# Patient Record
Sex: FEMALE | Race: WHITE | Employment: FULL TIME | URBAN - METROPOLITAN AREA
[De-identification: names, ages, dates, MRNs, and addresses within clinical notes are randomized per-mention and may not be internally consistent; named-entity substitution may affect disease eponyms.]

---

## 2018-09-27 PROBLEM — R19.7 DIARRHEA: Status: ACTIVE | Noted: 2018-09-27

## 2018-09-27 PROBLEM — E06.3 HYPOTHYROIDISM DUE TO HASHIMOTO'S THYROIDITIS: Status: ACTIVE | Noted: 2018-09-27

## 2023-01-04 PROBLEM — Z3A.14 14 WEEKS GESTATION OF PREGNANCY: Status: RESOLVED | Noted: 2022-07-07 | Resolved: 2023-01-04

## 2023-01-04 PROBLEM — Z3A.40 40 WEEKS GESTATION OF PREGNANCY: Status: RESOLVED | Noted: 2023-01-03 | Resolved: 2023-01-04

## 2023-03-07 ENCOUNTER — TELEPHONE (OUTPATIENT)
Dept: OBGYN | Age: 33
End: 2023-03-07

## 2023-03-15 ENCOUNTER — HOSPITAL ENCOUNTER (OUTPATIENT)
Dept: PHYSICAL THERAPY | Facility: CLINIC | Age: 33
Setting detail: THERAPIES SERIES
Discharge: HOME | End: 2023-03-15
Payer: COMMERCIAL

## 2023-03-15 PROCEDURE — 97161 PT EVAL LOW COMPLEX 20 MIN: CPT

## 2023-03-15 PROCEDURE — 97110 THERAPEUTIC EXERCISES: CPT

## 2023-03-15 PROCEDURE — 97530 THERAPEUTIC ACTIVITIES: CPT
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
## 2023-03-24 ENCOUNTER — HOSPITAL ENCOUNTER (OUTPATIENT)
Dept: PHYSICAL THERAPY | Facility: CLINIC | Age: 33
Setting detail: THERAPIES SERIES
Discharge: HOME | End: 2023-03-24
Payer: COMMERCIAL

## 2023-03-24 PROCEDURE — 97140 MANUAL THERAPY 1/> REGIONS: CPT

## 2023-03-24 PROCEDURE — 97110 THERAPEUTIC EXERCISES: CPT

## 2023-03-24 NOTE — FLOWSHEET NOTE
[] [address]  - [facility] &  Therapy  [address]  [phone]  F: [phone] [x] [address]  [facility] [address]   [address]  P: [phone]  F: [phone] [] [address]  [address]  P: [phone]  F: [phone] [] [address]  P: [phone]  F: [phone] [] [address]  [facility]   [address]   [place]: [phone]  F: [phone]      Physical Therapy Daily Treatment Note    Date:  3/24/2023  Patient Name:  Daniella Vallejo    :  1990  MRN: [id number]  Physician: Fernanda Charles DO                       Insurance: [employer] [facility] [place] PPO- 7 visits approved 3/15/23-23 Auth # [id number]  Medical Diagnosis: O80 (ICD-10-CM) - Vaginal delivery; O80 (ICD-10-CM) -  (spontaneous vaginal delivery)  Rehab Codes: R27.8, R29.3, M62.81, N39.46, R10.2, M99.05  Onset Date: 1/3/23       Next 's appt.: unknown  Visit# / total visits:  (7 approved)    Cancels/No Shows: 0    Subjective:    Pain:  [] Yes  [x] No  Location:  N/A  Pain Rating: (0-10 scale) 0/10  Pain altered Tx:  [x] No  [] Yes  Action:    Comments: Pt denies pain at this time. But states that her L pubic bone area continues to cause pain. Pt had noticed it with running, but has held running since starting PT. Pt will notice it at night when feeding her infant son in bed, causing her difficulty falling back to sleep at times. Pt denies c/o incontinence or pelvic heaviness.     Objective:  Modalities:   Precautions: h/o B hip labral repair   Exercises: [employer] Access Code: [email or web address]  KT= Kinesiotape  PB= pelvic bracing (DB+ kegel)  DB= diaphragmatic breathing  Exercise Reps/ Time Weight/ Level Comments   Pelvic model explanation & education

## 2023-03-30 ENCOUNTER — HOSPITAL ENCOUNTER (OUTPATIENT)
Dept: PHYSICAL THERAPY | Facility: CLINIC | Age: 33
Setting detail: THERAPIES SERIES
Discharge: HOME | End: 2023-03-30
Payer: COMMERCIAL

## 2023-03-30 PROCEDURE — 97140 MANUAL THERAPY 1/> REGIONS: CPT

## 2023-03-30 PROCEDURE — 97110 THERAPEUTIC EXERCISES: CPT

## 2023-03-30 PROCEDURE — 97530 THERAPEUTIC ACTIVITIES: CPT

## 2023-03-30 NOTE — FLOWSHEET NOTE
Contractions in Hooklying with Adduction  - 1 x daily - 7 x weekly - 10 reps - 5 seconds hold  - Pelvic Floor Contractions in Hooklying with Resisted Abduction  - 1 x daily - 7 x weekly - 2 sets - 10 reps  - Bent Knee Fallouts  - 1 x daily - 7 x weekly - 10 reps  - Supine Bridge with Pelvic Floor Contraction  - 1 x daily - 7 x weekly - 2 sets - 10 reps  - Cat Cow  - 1 x daily - 7 x weekly - 10 reps - 5 seconds hold  - Supine Shoulder Extension with Anchored Resistance  - 1 x daily - 7 x weekly - 1 sets - 10 reps  - Bear Plank from [employer]  - 1 x daily - 7 x weekly - 5 reps - 5-10 seconds hold  - Standing Anti-Rotation Press with Anchored Resistance  - 1 x daily - 7 x weekly - 2 sets - 10 reps  - Rowing with Pelvic Floor Contraction  - 1 x daily - 7 x weekly - 2 sets - 10 reps  - Mini Squat with Pelvic Floor Contraction band on thighs - 1 x daily - 7 x weekly - 2 sets - 10 reps  - Sit to Stand with Resistance Around Legs  - 1 x daily - 7 x weekly - 1 sets - 10 reps    Patient Education  - Urinary Incontinence  - Office Posture  - Sleep Positions  - Bowel Emptying Techniques  - Get To Know Your Pelvic Floor- Female  - Healthy Posture: How to Hold and Lift a Baby  - Flat Back Posture    Comprehension of Education:  [x] Verbalizes understanding. [x] Demonstrates understanding. [x] Needs review. To ensure dec in asymmetrical pelvic loading & good tolerance to PREs  [x] Demonstrates/verbalizes HEP/Ed previously given. Plan: [x] Continue current frequency toward long and short term goals. [x] Specific Instructions for subsequent treatments: HEP review, pelvic brace and excursion, DR manual sequence prn -may not be necessary with proper core/PFM & pressure management, higher level strengthening, run protocol.       Time In: 10:01am             Time Out: 11:01am    Electronically signed by:  Gino Lynn PT

## 2023-04-04 ENCOUNTER — HOSPITAL ENCOUNTER (OUTPATIENT)
Dept: PHYSICAL THERAPY | Facility: CLINIC | Age: 33
Setting detail: THERAPIES SERIES
Discharge: HOME | End: 2023-04-04
Payer: COMMERCIAL

## 2023-04-04 PROCEDURE — 97140 MANUAL THERAPY 1/> REGIONS: CPT

## 2023-04-04 PROCEDURE — 97110 THERAPEUTIC EXERCISES: CPT

## 2023-04-04 NOTE — FLOWSHEET NOTE
Educated pt on this pelvic belt for stabilization - donned - suggested S or M if future plans of pregnacy, on pelvic model; again briefly discussed refraining from asymmetrical loading on pelvis      Treatment Charges: Mins Units   []  Modalities     [x]  Ther Exercise 45 3   [x]  Manual Therapy 8 1   [x]  Ther Activities 2 0   []  Aquatics     []  Vasocompression     []  Other     Total Treatment time 55 4       Assessment: [x] Progressing toward goals. Began tx with manual therapy to address somatic dysfunction at B hip flexors ( L worse than R), as documented above with notable corrections & pain reduction upon completion. Proceeded with exercises as charted above to maintain pelvic girdle stabilization & PREs with proper pelvic bracing. Pt tolerates all ex & additions well, except for iso dead bug, which was attempted but reproduced PS pain; thus discontinued. See ex log for progressions. Post-tx pt feels good & reports she is pain-free. Updated HEP as detailed below & in ex chart log. Again discussed minimizing asymmetrical loading on pelvis; educated on somatic dysfunctions & purchase/use of pelvic belt. Will cont to progress postpartum running checklist as tolerated. [] No change. [] Other:  [x] Patient would continue to benefit from skilled physical therapy services in order to: strengthen PF, core, gluteals to improve EPI & pressure regulation, postural awareness, body mechanics & return to running & higher level pre-pregnancy function. Problems:   [x] ? Pain: L abdominopelvic, LBP, L SIJ  [x] ? ROM: PFM excursion, B Hip inflexibility (B HS, B piriformis, B hip flexors), L thoracic rotation   [x] ? Strength: core, PF, & B hip weakness   [x] ?  Function: ALAN: 25% impairment, 6 points; PPI 3 points - \"quite a bit\" for physical activity tolerance  [x] Other: NGUYEN, EPI, impaired load transfer, Core instability & difficultly managing IAP noted with B SLS & B ASLR; pelvic girdle dysfunction - see

## 2023-04-18 ENCOUNTER — HOSPITAL ENCOUNTER (OUTPATIENT)
Dept: PHYSICAL THERAPY | Facility: CLINIC | Age: 33
Setting detail: THERAPIES SERIES
Discharge: HOME | End: 2023-04-18
Payer: COMMERCIAL

## 2023-04-18 PROCEDURE — 97110 THERAPEUTIC EXERCISES: CPT

## 2023-04-18 PROCEDURE — 97140 MANUAL THERAPY 1/> REGIONS: CPT

## 2023-04-18 NOTE — FLOWSHEET NOTE
3 x daily - 7 x weekly - 1 sets - 10 reps - 6 second hold  - Pelvic Floor Contractions in Hooklying with Adduction  - 1 x daily - 7 x weekly - 10 reps - 6 seconds hold  - Pelvic Floor Contractions in Hooklying with Resisted Abduction  - 1 x daily - 7 x weekly - 2 sets - 10 reps  - Bent Knee Fallouts  - 1 x daily - 7 x weekly - 10 reps  - Supine Bridge with Pelvic Floor Contraction  - 1 x daily - 7 x weekly - 2 sets - 10 reps  - Supine Shoulder Extension with Anchored Resistance  - 1 x daily - 7 x weekly - 1 sets - 10 reps  - Cat Cow  - 1 x daily - 7 x weekly - 10 reps - 6 seconds hold  - Quadruped Pelvic Floor Contraction with Opposite Arm and Leg Lift  - 1 x daily - 7 x weekly - 2 sets - 5 reps  - Bear Plank from [employer] with ball squeeze - 1 x daily - 7 x weekly - 5 reps - 5-10 seconds hold  - Standing Anti-Rotation Press with Anchored Resistance  - 1 x daily - 7 x weekly - 2 sets - 10 reps  - Rowing with Pelvic Floor Contraction  - 1 x daily - 7 x weekly - 2 sets - 10 reps  - Mini Squat with Pelvic Floor Contraction  - 1 x daily - 7 x weekly - 2 sets - 10 reps  - Sit to Stand with Resistance Around Legs & infant press  - 1 x daily - 7 x weekly - 1 sets - 10 reps  - Standing Heel Raise  - 1 x daily - 7 x weekly - 4 sets - 5 reps  - Sidestepping with Pelvic Floor Contraction  - 1 x daily - 7 x weekly - 1 sets - 10 reps    Patient Education  - Urinary Incontinence  - Office Posture  - Sleep Positions  - Bowel Emptying Techniques  - Get To Know Your Pelvic Floor- Female  - Healthy Posture: How to Hold and Lift a Baby  - Flat Back Posture    Comprehension of Education:  [] Verbalizes understanding. [] Demonstrates understanding. [] Needs review for pain complaints  [x] Demonstrates/verbalizes HEP/Ed previously given. Plan: [x] Continue current frequency toward long and short term goals. [x] Specific Instructions for subsequent treatments: update HEP as appropriate, correction of somatic dysfunction. , higher

## 2023-04-25 ENCOUNTER — HOSPITAL ENCOUNTER (OUTPATIENT)
Dept: PHYSICAL THERAPY | Facility: CLINIC | Age: 33
Setting detail: THERAPIES SERIES
Discharge: HOME | End: 2023-04-25
Payer: COMMERCIAL

## 2023-04-25 PROCEDURE — 97110 THERAPEUTIC EXERCISES: CPT

## 2023-04-25 PROCEDURE — 97140 MANUAL THERAPY 1/> REGIONS: CPT

## 2023-04-25 NOTE — FLOWSHEET NOTE
difficultly managing IAP noted with B SLS & B ASLR; pelvic girdle dysfunction - see objective testing in eval for + cluster testing       LTG: (to be met in 14 treatments) reassess at 7 visits due to visit approval   Pt will be able to isolate PF musculature for improved PF awareness & function - met  ? Strength: Pt to achieve PF strength of 4/5 &/or endurance for >8 seconds for optimal PF function   ? Function: Pt will have no reports of leaking with cough, sneeze and laugh, running or higher level ADLs for improved urogenital function - met  Independent with Home Exercise Programs for improved core & PFM function - met  Pt to demo ability to hold B ASLR & B SLS without significant compensations to reduce load transfer and improve core, SL stability & endurance  Pt will demo bilateral hip to 4+/5 & abdominal strength to \"good\" for improved ADLs   Pt to demo improved IAP regulation and reduction of EPI by 0.5 finger width or greater & < 0.5-1 knuckle depth at for improved core stability & proper tension generation through linea alba. Pt will report 2-3 point improvement on ALAN & report \"little bit\" or \"not at all\" on PPIQ (for physical activity) to demonstrate improved pelvic health functioning  Pt will demonstrate proper breath, core & pressure management, & no pelvic or urinary sxs, with return to running protocol to progress running hobby. ongoing  Pt will report ability to return to running 2 miles or greater without pain to resume running hobby- not met d/t pain     Patient goals: \"Return to higher level physical activity & decrease pain\"    Pt. Education:  [x] Yes  [] No  [x] Reviewed Prior HEP/Ed  Method of Education: [x] Verbal   Demo - see assessment for details    [] Written-    Access Code: [email or web address]  URL: [email or web address]  Date: 04/04/2023  Prepared by: Ernesto Glaser    Program Notes  Serola Belt Stool or squatty potty  for defecation Lumbar roll for backexhale & pelvic brace with effort &

## 2023-05-02 ENCOUNTER — HOSPITAL ENCOUNTER (OUTPATIENT)
Dept: PHYSICAL THERAPY | Facility: CLINIC | Age: 33
Setting detail: THERAPIES SERIES
Discharge: HOME | End: 2023-05-02
Payer: COMMERCIAL

## 2023-05-02 PROCEDURE — 97140 MANUAL THERAPY 1/> REGIONS: CPT

## 2023-05-02 PROCEDURE — 97110 THERAPEUTIC EXERCISES: CPT

## 2023-05-02 RX ORDER — LEVOTHYROXINE SODIUM 0.07 MG/1
37.5 TABLET ORAL DAILY
Qty: 90 TABLET | Refills: 5 | Status: SHIPPED | OUTPATIENT
Start: 2023-05-02

## 2023-05-02 NOTE — FLOWSHEET NOTE
PB 4x5 Holding 15# ball New 4/4   Dead lift 10x 2-8#db Increased weight 4/25; good form   Staggered stance dead lift 10x ea 2-8# db Added 4/25   Sidestepping with PB  10x Blue  New 4/4   SL sit to stand 10x ea  Added 5/2         Other: bolded completed 05/02/23; pt's son Micaela Dozier present for treatment     Manual: hypervolt in prone to L sacral ligaments, followed by manual correction for L extended sacrum. Treatment Charges: Mins Units   []  Modalities     [x]  Ther Exercise 37 2   [x]  Manual Therapy 9 1   []  Ther Activities     []  Aquatics     []  Vasocompression     []  Other     Total Treatment time 46 3       Assessment: [x] Progressing toward goals. Started session with goal check (refer below). Then, checked pelvic alignment. Pt with equal pubic alignment this date and reduced tenderness to palpation. Pt continues with sacral extension on the L side. Addressed this with manual treatment noted above. Continued then with exercises to progress pelvic floor and pelvic stability. Pt tolerates exercise progressions well. Pending pain will progress toward return to running at next session. [] No change. [] Other:    [x] Patient would continue to benefit from skilled physical therapy services in order to: strengthen PF, core, gluteals to improve EPI & pressure regulation, postural awareness, body mechanics & return to running & higher level pre-pregnancy function. Problems:   [x] ? Pain: L abdominopelvic, LBP, L SIJ  [x] ? ROM: PFM excursion, B Hip inflexibility (B HS, B piriformis, B hip flexors), L thoracic rotation   [x] ? Strength: core, PF, & B hip weakness   [x] ?  Function: ALAN: 25% impairment, 6 points; PPI 3 points - \"quite a bit\" for physical activity tolerance  [x] Other: NGUYEN, EPI, impaired load transfer, Core instability & difficultly managing IAP noted with B SLS & B ASLR; pelvic girdle dysfunction - see objective testing in eval for + cluster testing       LTG: (to be met in 14

## 2023-05-07 ENCOUNTER — TELEPHONE (OUTPATIENT)
Dept: OBGYN | Age: 33
End: 2023-05-07

## 2023-05-07 RX ORDER — DICLOXACILLIN SODIUM 250 MG/1
250 CAPSULE ORAL 4 TIMES DAILY
Qty: 40 CAPSULE | Refills: 0 | Status: SHIPPED | OUTPATIENT
Start: 2023-05-07 | End: 2023-05-17

## 2023-05-08 NOTE — TELEPHONE ENCOUNTER
Patient with complaints of mastitis. Rx sent for to pharmacy.      Jordan Ryder DO  4/7/8122, 8:23 AM

## 2023-05-12 ENCOUNTER — HOSPITAL ENCOUNTER (OUTPATIENT)
Dept: PHYSICAL THERAPY | Facility: CLINIC | Age: 33
Setting detail: THERAPIES SERIES
Discharge: HOME | End: 2023-05-12
Payer: COMMERCIAL

## 2023-05-12 PROCEDURE — 97110 THERAPEUTIC EXERCISES: CPT

## 2023-05-12 NOTE — FLOWSHEET NOTE
Heel Raise  - 1 x daily - 7 x weekly - 4 sets - 5 reps  - Sidestepping with Pelvic Floor Contraction  - 1 x daily - 7 x weekly - 1 sets - 10 reps  - Single Leg Sit to Stand with Arms Extended  - 1 x daily - 7 x weekly - 10 reps  - Deadlift with Pelvic Contraction  - 1 x daily - 7 x weekly - 10 reps  - Modified Single-Leg Deadlift  - 1 x daily - 7 x weekly - 10 reps  Patient Education  - Urinary Incontinence  - Office Posture  - Sleep Positions  - Bowel Emptying Techniques  - Get To Know Your Pelvic Floor- Female  - Healthy Posture: How to Hold and Lift a Baby  - Flat Back Posture  Comprehension of Education:  [x] Verbalizes understanding. [x] Demonstrates understanding. [x] Needs review   [x] Demonstrates/verbalizes HEP/Ed previously given. Plan: [x] Continue current frequency toward long and short term goals.   Pt can only attend PT Tuesday and Friday d/t work schedule   [x] Specific Instructions for subsequent treatments: monitor response to return to running checklist      Time In: 11:08am             Time Out: 11:46am    Electronically signed by:  Yadiel Funez PTA

## 2024-02-29 SDOH — HEALTH STABILITY: PHYSICAL HEALTH: ON AVERAGE, HOW MANY MINUTES DO YOU ENGAGE IN EXERCISE AT THIS LEVEL?: 50 MIN

## 2024-02-29 SDOH — HEALTH STABILITY: PHYSICAL HEALTH: ON AVERAGE, HOW MANY DAYS PER WEEK DO YOU ENGAGE IN MODERATE TO STRENUOUS EXERCISE (LIKE A BRISK WALK)?: 6 DAYS

## 2024-03-01 ENCOUNTER — OFFICE VISIT (OUTPATIENT)
Age: 34
End: 2024-03-01
Payer: COMMERCIAL

## 2024-03-01 ENCOUNTER — HOSPITAL ENCOUNTER (OUTPATIENT)
Age: 34
Setting detail: SPECIMEN
Discharge: HOME | End: 2024-03-01

## 2024-03-01 VITALS
RESPIRATION RATE: 12 BRPM | HEIGHT: 64 IN | WEIGHT: 124.8 LBS | HEART RATE: 79 BPM | BODY MASS INDEX: 21.31 KG/M2 | DIASTOLIC BLOOD PRESSURE: 62 MMHG | SYSTOLIC BLOOD PRESSURE: 99 MMHG

## 2024-03-01 DIAGNOSIS — E03.8 HYPOTHYROIDISM DUE TO HASHIMOTO'S THYROIDITIS: ICD-10-CM

## 2024-03-01 DIAGNOSIS — Z11.59 NEED FOR HEPATITIS C SCREENING TEST: ICD-10-CM

## 2024-03-01 DIAGNOSIS — Z13.220 SCREENING, LIPID: ICD-10-CM

## 2024-03-01 DIAGNOSIS — E03.8 HYPOTHYROIDISM DUE TO HASHIMOTO'S THYROIDITIS: Primary | ICD-10-CM

## 2024-03-01 DIAGNOSIS — E06.3 HYPOTHYROIDISM DUE TO HASHIMOTO'S THYROIDITIS: Primary | ICD-10-CM

## 2024-03-01 DIAGNOSIS — Z00.00 ROUTINE GENERAL MEDICAL EXAMINATION AT A HEALTH CARE FACILITY: ICD-10-CM

## 2024-03-01 DIAGNOSIS — E06.3 HYPOTHYROIDISM DUE TO HASHIMOTO'S THYROIDITIS: ICD-10-CM

## 2024-03-01 LAB
ALBUMIN SERPL-MCNC: 4.2 G/DL (ref 3.5–5.2)
ALBUMIN/GLOB SERPL: 2 {RATIO} (ref 1–2.5)
ALP SERPL-CCNC: 63 U/L (ref 35–104)
ALT SERPL-CCNC: 16 U/L (ref 10–35)
ANION GAP SERPL CALCULATED.3IONS-SCNC: 10 MMOL/L (ref 9–16)
AST SERPL-CCNC: 19 U/L (ref 10–35)
BASOPHILS # BLD: <0.03 K/UL (ref 0–0.2)
BASOPHILS NFR BLD: 0 % (ref 0–2)
BILIRUB SERPL-MCNC: 0.4 MG/DL (ref 0–1.2)
BUN SERPL-MCNC: 9 MG/DL (ref 6–20)
CALCIUM SERPL-MCNC: 9.6 MG/DL (ref 8.6–10.4)
CHLORIDE SERPL-SCNC: 105 MMOL/L (ref 98–107)
CHOLEST SERPL-MCNC: 111 MG/DL (ref 0–199)
CHOLESTEROL/HDL RATIO: 3
CO2 SERPL-SCNC: 28 MMOL/L (ref 20–31)
CREAT SERPL-MCNC: 0.7 MG/DL (ref 0.5–0.9)
EOSINOPHIL # BLD: <0.03 K/UL (ref 0–0.44)
EOSINOPHILS RELATIVE PERCENT: 0 % (ref 1–4)
ERYTHROCYTE [DISTWIDTH] IN BLOOD BY AUTOMATED COUNT: 11.8 % (ref 11.8–14.4)
GFR SERPL CREATININE-BSD FRML MDRD: >60 ML/MIN/1.73M2
GLUCOSE SERPL-MCNC: 82 MG/DL (ref 74–99)
HCT VFR BLD AUTO: 37.8 % (ref 36.3–47.1)
HCV AB SERPL QL IA: NONREACTIVE
HDLC SERPL-MCNC: 40 MG/DL
HGB BLD-MCNC: 12.2 G/DL (ref 11.9–15.1)
IMM GRANULOCYTES # BLD AUTO: <0.03 K/UL (ref 0–0.3)
IMM GRANULOCYTES NFR BLD: 0 %
LDLC SERPL CALC-MCNC: 64 MG/DL (ref 0–100)
LYMPHOCYTES NFR BLD: 1.51 K/UL (ref 1.1–3.7)
LYMPHOCYTES RELATIVE PERCENT: 28 % (ref 24–43)
MCH RBC QN AUTO: 30.7 PG (ref 25.2–33.5)
MCHC RBC AUTO-ENTMCNC: 32.3 G/DL (ref 28.4–34.8)
MCV RBC AUTO: 95 FL (ref 82.6–102.9)
MONOCYTES NFR BLD: 0.28 K/UL (ref 0.1–1.2)
MONOCYTES NFR BLD: 5 % (ref 3–12)
NEUTROPHILS NFR BLD: 67 % (ref 36–65)
NEUTS SEG NFR BLD: 3.54 K/UL (ref 1.5–8.1)
NRBC BLD-RTO: 0 PER 100 WBC
PLATELET # BLD AUTO: 294 K/UL (ref 138–453)
PMV BLD AUTO: 10.4 FL (ref 8.1–13.5)
POTASSIUM SERPL-SCNC: 3.9 MMOL/L (ref 3.7–5.3)
PROT SERPL-MCNC: 7 G/DL (ref 6.6–8.7)
RBC # BLD AUTO: 3.98 M/UL (ref 3.95–5.11)
SODIUM SERPL-SCNC: 143 MMOL/L (ref 136–145)
TRIGL SERPL-MCNC: 37 MG/DL
TSH SERPL DL<=0.05 MIU/L-ACNC: 2.1 UIU/ML (ref 0.27–4.2)
VLDLC SERPL CALC-MCNC: 7 MG/DL
WBC OTHER # BLD: 5.4 K/UL (ref 3.5–11.3)

## 2024-03-01 PROCEDURE — 99203 OFFICE O/P NEW LOW 30 MIN: CPT | Performed by: STUDENT IN AN ORGANIZED HEALTH CARE EDUCATION/TRAINING PROGRAM

## 2024-03-01 PROCEDURE — 99213 OFFICE O/P EST LOW 20 MIN: CPT | Performed by: STUDENT IN AN ORGANIZED HEALTH CARE EDUCATION/TRAINING PROGRAM

## 2024-03-01 RX ORDER — CHOLECALCIFEROL (VITAMIN D3) 125 MCG
CAPSULE ORAL
COMMUNITY

## 2024-03-01 SDOH — ECONOMIC STABILITY: FOOD INSECURITY: WITHIN THE PAST 12 MONTHS, THE FOOD YOU BOUGHT JUST DIDN'T LAST AND YOU DIDN'T HAVE MONEY TO GET MORE.: NEVER TRUE

## 2024-03-01 SDOH — ECONOMIC STABILITY: HOUSING INSECURITY
IN THE LAST 12 MONTHS, WAS THERE A TIME WHEN YOU DID NOT HAVE A STEADY PLACE TO SLEEP OR SLEPT IN A SHELTER (INCLUDING NOW)?: NO

## 2024-03-01 SDOH — ECONOMIC STABILITY: INCOME INSECURITY: HOW HARD IS IT FOR YOU TO PAY FOR THE VERY BASICS LIKE FOOD, HOUSING, MEDICAL CARE, AND HEATING?: NOT HARD AT ALL

## 2024-03-01 SDOH — ECONOMIC STABILITY: FOOD INSECURITY: WITHIN THE PAST 12 MONTHS, YOU WORRIED THAT YOUR FOOD WOULD RUN OUT BEFORE YOU GOT MONEY TO BUY MORE.: NEVER TRUE

## 2024-03-01 ASSESSMENT — PATIENT HEALTH QUESTIONNAIRE - PHQ9
2. FEELING DOWN, DEPRESSED OR HOPELESS: NOT AT ALL
1. LITTLE INTEREST OR PLEASURE IN DOING THINGS: NOT AT ALL
SUM OF ALL RESPONSES TO PHQ QUESTIONS 1-9: 0
SUM OF ALL RESPONSES TO PHQ9 QUESTIONS 1 & 2: 0
SUM OF ALL RESPONSES TO PHQ QUESTIONS 1-9: 0

## 2024-03-01 NOTE — PROGRESS NOTES
[facility]  [address] [place] [address]  Phone: [phone]  Fax: [phone]      Date of Visit:  3/1/24   Patient Name: Cuca Sanon   Patient :  1990     ASSESSMENT/PLAN     1. Hypothyroidism due to Hashimoto's thyroiditis  -     TSH With Reflex Ft4; Future  2. Screening, lipid  -     Lipid Panel; Future  3. Routine general medical examination at a health care facility  -     Comprehensive Metabolic Panel; Future  -     CBC with Auto Differential; Future  4. Need for hepatitis C screening test  -     Hepatitis C Antibody; Future     New patient today    History of Hashimoto's thyroiditis currently on levothyroxine 37.5 mcg daily.  Needs updated TSH.  Is try to get pregnant, did discuss with patient need for increased dose once pregnant.  Verbalized understanding.  Agreeable to other lab work as above.  No other particular questions or concerns.  Advised preconception counseling appointment with OB/GYN    No follow-ups on file.    - Questions/concerns answered. Patient verbalized and expressed understanding. Medications, laboratory testing, imaging, consultation, and follow up as documented in this encounter.     Patient was discussed with preceptor, Dr. Quan at the time of the encounter.      BMI was elevated today, and weight loss plan recommended is : conventional weight loss.     HPI:     Cuca Sanon is a 33 y.o. female with   Patient Active Problem List   Diagnosis    Hypothyroidism due to Hashimoto's thyroiditis    Raynaud's disease without gangrene    Iron deficiency anemia    Diarrhea    Elevated antinuclear antibody (MARLEY) level    Elevated LFTs     who presents today to discuss   Chief Complaint   Patient presents with    New Patient     Hypothyroid needs labs        HPI:     Patient presents as new patient today.  Medical history of Raynaud's disease, deficiency anemia, elevated MARLEY with subsequent negative  H Plasty Text: Given the location of the defect, shape of the defect and the proximity to free margins a H-plasty was deemed most appropriate for repair.  Using a sterile surgical marker, the appropriate advancement arms of the H-plasty were drawn incorporating the defect and placing the expected incisions within the relaxed skin tension lines where possible. The area thus outlined was incised deep to adipose tissue with a #15 scalpel blade. The skin margins were undermined to an appropriate distance in all directions utilizing iris scissors.  The opposing advancement arms were then advanced into place in opposite direction and anchored with interrupted buried subcutaneous sutures.

## 2024-03-01 NOTE — PATIENT INSTRUCTIONS
Thank you for coming in today, Cuca.  As discussed, we are ordering several labs. We will call you with results. I would recommend follow up at least annually. If you become pregnant, I would also recommend follow up after delivering to make sure your thyroid dosing is still appropriate.  Call or return with any additional questions or concerns.

## 2024-04-09 DIAGNOSIS — N97.0 ABSENCE OF OVULATION: Primary | ICD-10-CM

## 2024-04-16 DIAGNOSIS — Z13.220 SCREENING, LIPID: ICD-10-CM

## 2024-04-16 DIAGNOSIS — E06.3 HYPOTHYROIDISM DUE TO HASHIMOTO'S THYROIDITIS: Primary | ICD-10-CM

## 2024-04-16 DIAGNOSIS — E03.8 HYPOTHYROIDISM DUE TO HASHIMOTO'S THYROIDITIS: Primary | ICD-10-CM

## 2024-04-23 ENCOUNTER — HOSPITAL ENCOUNTER (OUTPATIENT)
Age: 34
Setting detail: SPECIMEN
Discharge: HOME | End: 2024-04-23

## 2024-04-23 DIAGNOSIS — E03.8 HYPOTHYROIDISM DUE TO HASHIMOTO'S THYROIDITIS: ICD-10-CM

## 2024-04-23 DIAGNOSIS — N97.0 ABSENCE OF OVULATION: ICD-10-CM

## 2024-04-23 DIAGNOSIS — E06.3 HYPOTHYROIDISM DUE TO HASHIMOTO'S THYROIDITIS: ICD-10-CM

## 2024-04-23 LAB
PROGEST SERPL-MCNC: 7.47 NG/ML
TSH SERPL DL<=0.05 MIU/L-ACNC: 2.77 UIU/ML (ref 0.27–4.2)

## 2024-05-07 DIAGNOSIS — E03.8 HYPOTHYROIDISM DUE TO HASHIMOTO'S THYROIDITIS: Primary | ICD-10-CM

## 2024-05-07 DIAGNOSIS — E06.3 HYPOTHYROIDISM DUE TO HASHIMOTO'S THYROIDITIS: Primary | ICD-10-CM

## 2024-05-07 RX ORDER — LEVOTHYROXINE SODIUM 0.07 MG/1
37.5 TABLET ORAL DAILY
Qty: 90 TABLET | Refills: 0 | Status: SHIPPED | OUTPATIENT
Start: 2024-05-07

## 2024-05-26 ENCOUNTER — TELEPHONE (OUTPATIENT)
Dept: OBGYN CLINIC | Age: 34
End: 2024-05-26

## 2024-05-26 RX ORDER — ONDANSETRON 4 MG/1
4 TABLET, FILM COATED ORAL EVERY 8 HOURS PRN
Qty: 60 TABLET | Refills: 1 | Status: SHIPPED | OUTPATIENT
Start: 2024-05-26

## 2024-05-31 ENCOUNTER — HOSPITAL ENCOUNTER (OUTPATIENT)
Age: 34
Setting detail: SPECIMEN
Discharge: HOME | End: 2024-05-31

## 2024-05-31 ENCOUNTER — TELEPHONE (OUTPATIENT)
Dept: OBGYN | Age: 34
End: 2024-05-31

## 2024-05-31 DIAGNOSIS — E03.8 HYPOTHYROIDISM DUE TO HASHIMOTO'S THYROIDITIS: ICD-10-CM

## 2024-05-31 DIAGNOSIS — Z3A.08 8 WEEKS GESTATION OF PREGNANCY: Primary | ICD-10-CM

## 2024-05-31 DIAGNOSIS — E06.3 HYPOTHYROIDISM DUE TO HASHIMOTO'S THYROIDITIS: ICD-10-CM

## 2024-05-31 LAB — TSH SERPL DL<=0.05 MIU/L-ACNC: 0.72 UIU/ML (ref 0.27–4.2)

## 2024-05-31 NOTE — TELEPHONE ENCOUNTER
Patient seen and examined and US completed. US c/w patient's LMP of 4/1/24 which gives DAJA of 1/6/25 and AGA today of 8w4d. Initial prenatal labs ordered. Desires NIPT. Does not want to know gender. Orders placed.     Lucrecia Camacho,   5/31/2024, 5:56 PM

## 2024-06-17 ENCOUNTER — ROUTINE PRENATAL (OUTPATIENT)
Dept: OBGYN | Age: 34
End: 2024-06-17

## 2024-06-17 DIAGNOSIS — Z3A.11 11 WEEKS GESTATION OF PREGNANCY: Primary | ICD-10-CM

## 2024-06-17 PROBLEM — R79.89 ELEVATED LFTS: Status: RESOLVED | Noted: 2018-10-30 | Resolved: 2024-06-17

## 2024-06-17 PROCEDURE — 0500F INITIAL PRENATAL CARE VISIT: CPT | Performed by: OBSTETRICS & GYNECOLOGY

## 2024-06-17 NOTE — PROGRESS NOTES
[facility]  Initial Prenatal Visit    CC: Initial Prenatal Visit    HPI:   Jacey Sanon is a 33 y.o. female  at 11w0d  She is being seen today for her first obstetrical visit. Pregnancy history fully reviewed. This is a planned pregnancy. Her LMP is Patient's last menstrual period was 2024.     The patient was seen and evaluated. The patient complains of nothing.     OB History:  OB History    Para Term  AB Living   2 1 1 0 0 1   SAB IAB Ectopic Molar Multiple Live Births   0 0 0 0 0 1      # Outcome Date GA Lbr Maximus/2nd Weight Sex Delivery Anes PTL Lv   2 Current            1 Term 23 40w4d 01:18 / 00:29 3.57 kg (7 lb 13.9 oz) M Vag-Spont Nitrous Oxid, Pud N CARON      Name: ELVIRA,BABY BOY JACEY      Apgar1: 8  Apgar5: 9       Past Medical History:  Past Medical History:   Diagnosis Date    Hypothyroidism 12 years of age?    Iron deficiency anemia     Raynaud disease     Thyroid disease        Past Surgical History:  Past Surgical History:   Procedure Laterality Date    HIP ARTHROPLASTY Bilateral         Medications:  Current Outpatient Medications on File Prior to Visit   Medication Sig Dispense Refill    ondansetron (ZOFRAN) 4 MG tablet Take 1 tablet by mouth every 8 hours as needed for Nausea 60 tablet 1    levothyroxine (SYNTHROID) 75 MCG tablet Take 0.5 tablets by mouth daily 90 tablet 0    Cholecalciferol (VITAMIN D) 125 MCG (5000 UT) CAPS       Docosahexaenoic Acid (PRENATAL DHA PO) Take by mouth      Calcium 200 MG TABS Take by mouth      magnesium 200 MG TABS tablet Take 1 tablet by mouth daily      IRON PO Take by mouth       No current facility-administered medications on file prior to visit.       Allergies:  Allergies as of 2024    (No Known Allergies)       Social History:  Social History     Socioeconomic History    Marital status:      Spouse name: Not on file    Number of children: Not on file    Years of education: Not on file    Highest

## 2024-06-18 ENCOUNTER — TELEPHONE (OUTPATIENT)
Dept: OBGYN | Age: 34
End: 2024-06-18

## 2024-06-18 ENCOUNTER — HOSPITAL ENCOUNTER (OUTPATIENT)
Age: 34
Setting detail: SPECIMEN
Discharge: HOME | End: 2024-06-18

## 2024-06-18 DIAGNOSIS — Z3A.11 11 WEEKS GESTATION OF PREGNANCY: Primary | ICD-10-CM

## 2024-06-18 DIAGNOSIS — Z3A.08 8 WEEKS GESTATION OF PREGNANCY: ICD-10-CM

## 2024-06-18 LAB
ABO + RH BLD: NORMAL
BASOPHILS # BLD: 0.03 K/UL (ref 0–0.2)
BASOPHILS NFR BLD: 1 % (ref 0–2)
BLOOD GROUP ANTIBODIES SERPL: NEGATIVE
EOSINOPHIL # BLD: 0.06 K/UL (ref 0–0.44)
EOSINOPHILS RELATIVE PERCENT: 1 % (ref 1–4)
ERYTHROCYTE [DISTWIDTH] IN BLOOD BY AUTOMATED COUNT: 12.9 % (ref 11.8–14.4)
HBV SURFACE AG SERPL QL IA: NONREACTIVE
HCT VFR BLD AUTO: 39.8 % (ref 36.3–47.1)
HCV AB SERPL QL IA: NONREACTIVE
HGB BLD-MCNC: 13.2 G/DL (ref 11.9–15.1)
HIV 1+2 AB+HIV1 P24 AG SERPL QL IA: NONREACTIVE
IMM GRANULOCYTES # BLD AUTO: <0.03 K/UL (ref 0–0.3)
IMM GRANULOCYTES NFR BLD: 0 %
LYMPHOCYTES NFR BLD: 1.28 K/UL (ref 1.1–3.7)
LYMPHOCYTES RELATIVE PERCENT: 20 % (ref 24–43)
MCH RBC QN AUTO: 31 PG (ref 25.2–33.5)
MCHC RBC AUTO-ENTMCNC: 33.2 G/DL (ref 28.4–34.8)
MCV RBC AUTO: 93.4 FL (ref 82.6–102.9)
MONOCYTES NFR BLD: 0.27 K/UL (ref 0.1–1.2)
MONOCYTES NFR BLD: 4 % (ref 3–12)
NEUTROPHILS NFR BLD: 74 % (ref 36–65)
NEUTS SEG NFR BLD: 4.84 K/UL (ref 1.5–8.1)
NRBC BLD-RTO: 0 PER 100 WBC
PLATELET # BLD AUTO: 276 K/UL (ref 138–453)
PMV BLD AUTO: 11.2 FL (ref 8.1–13.5)
RBC # BLD AUTO: 4.26 M/UL (ref 3.95–5.11)
RUBV IGG SERPL QL IA: 121 IU/ML
T PALLIDUM AB SER QL IA: NONREACTIVE
TSH SERPL DL<=0.05 MIU/L-ACNC: 1.86 UIU/ML (ref 0.27–4.2)
WBC OTHER # BLD: 6.5 K/UL (ref 3.5–11.3)

## 2024-06-18 NOTE — TELEPHONE ENCOUNTER
Patient came in for [employer] genetic testing and prenatal labs.  Patient's questions were answered and she was escorted to the lab. [employer] testing kit was completed with blood specimen, lab orders, face sheet and insurance card were placed in [employer] bag and ready for .

## 2024-06-25 DIAGNOSIS — Z3A.11 11 WEEKS GESTATION OF PREGNANCY: ICD-10-CM

## 2024-07-19 ENCOUNTER — ROUTINE PRENATAL (OUTPATIENT)
Dept: OBGYN | Age: 34
End: 2024-07-19

## 2024-07-19 DIAGNOSIS — Z3A.17 17 WEEKS GESTATION OF PREGNANCY: Primary | ICD-10-CM

## 2024-07-19 PROCEDURE — 0502F SUBSEQUENT PRENATAL CARE: CPT | Performed by: OBSTETRICS & GYNECOLOGY

## 2024-07-29 NOTE — PROGRESS NOTES
Prenatal Visit    Cuca Sanon is a 33 y.o. female  at 15w4d    Subjective:  The patient was seen and evaluated. She complains of nothing.      The problem list reflects the active issues addressed during today's visit    VITALS:  111/76  P 82    Assessment & Plan:  Cuca Sanon is a 33 y.o. female  at 15w4d   - Initial prenatal labs were reviewed   - An 18-22 week anatomy ultrasound has been ordered   - NIPT reviewed and normal    - TSH in 3-4 weeks    Patient Active Problem List    Diagnosis Date Noted    Elevated antinuclear antibody (MARLEY) level 10/30/2018    Hypothyroidism due to Hashimoto's thyroiditis 2018    Raynaud's disease without gangrene 2018    Iron deficiency anemia 2018    Diarrhea 2018         VERONICA CARL DO  Ob/Gyn

## 2024-08-05 DIAGNOSIS — Z3A.18 18 WEEKS GESTATION OF PREGNANCY: Primary | ICD-10-CM

## 2024-08-23 ENCOUNTER — ROUTINE PRENATAL (OUTPATIENT)
Dept: OBGYN | Age: 34
End: 2024-08-23

## 2024-08-23 VITALS — DIASTOLIC BLOOD PRESSURE: 83 MMHG | HEART RATE: 79 BPM | SYSTOLIC BLOOD PRESSURE: 117 MMHG

## 2024-08-23 DIAGNOSIS — E06.3 HYPOTHYROIDISM DUE TO HASHIMOTO'S THYROIDITIS: ICD-10-CM

## 2024-08-23 DIAGNOSIS — R10.2 PELVIC PAIN: ICD-10-CM

## 2024-08-23 DIAGNOSIS — I73.00 RAYNAUD'S DISEASE WITHOUT GANGRENE: Primary | ICD-10-CM

## 2024-08-23 DIAGNOSIS — E03.8 HYPOTHYROIDISM DUE TO HASHIMOTO'S THYROIDITIS: ICD-10-CM

## 2024-08-23 NOTE — PROGRESS NOTES
Prenatal Visit    Cuca Sanon is a 33 y.o. female  at 20w4d    The patient was seen and evaluated. She complains of nothing. She reports positive fetal movements. She denies contractions, vaginal bleeding and leakage of fluid. Signs and symptoms of labor and pre-eclampsia were reviewed with the patient in detail. She is to report any of these if they occur. She currently denies signs or symptoms of pre-eclampsia including headache, vision changes, RUQ pain.      The problem list reflects the active issues addressed during today's visit    Vitals:  BP: 117/83  Pulse: 79  Fundal Height (cm): 20 cm  Fetal HR: 155  Movement: Present       Assessment & Plan:  Cuca Sanon is a 33 y.o. female  at 20w4d   - An 18-22 week anatomy ultrasound has been ordered   - NIPT was ordered and was normal   - Pelvic floor PT ordered due to symptoms of pelvic pain with running and urinary incontinence    Patient Active Problem List    Diagnosis Date Noted    Elevated antinuclear antibody (MARLEY) level 10/30/2018    Hypothyroidism due to Hashimoto's thyroiditis 2018    Raynaud's disease without gangrene 2018    Iron deficiency anemia 2018    Diarrhea 2018         VERONICA CARL DO  Ob/Gyn   [facility], [place]  2024, 12:28 PM

## 2024-08-27 ENCOUNTER — ROUTINE PRENATAL (OUTPATIENT)
Dept: PERINATAL CARE | Age: 34
End: 2024-08-27
Payer: COMMERCIAL

## 2024-08-27 ENCOUNTER — HOSPITAL ENCOUNTER (OUTPATIENT)
Age: 34
Setting detail: SPECIMEN
Discharge: HOME | End: 2024-08-27

## 2024-08-27 VITALS
HEIGHT: 64 IN | HEART RATE: 89 BPM | DIASTOLIC BLOOD PRESSURE: 64 MMHG | TEMPERATURE: 98.2 F | SYSTOLIC BLOOD PRESSURE: 107 MMHG | WEIGHT: 144.4 LBS | BODY MASS INDEX: 24.65 KG/M2 | RESPIRATION RATE: 16 BRPM

## 2024-08-27 DIAGNOSIS — E03.9 HYPOTHYROIDISM AFFECTING PREGNANCY IN SECOND TRIMESTER: ICD-10-CM

## 2024-08-27 DIAGNOSIS — O44.00 PLACENTA PREVIA WITHOUT HEMORRHAGE, ANTEPARTUM: ICD-10-CM

## 2024-08-27 DIAGNOSIS — Z3A.21 21 WEEKS GESTATION OF PREGNANCY: ICD-10-CM

## 2024-08-27 DIAGNOSIS — O09.522 MULTIGRAVIDA OF ADVANCED MATERNAL AGE IN SECOND TRIMESTER: Primary | ICD-10-CM

## 2024-08-27 DIAGNOSIS — Z36.86 ENCOUNTER FOR SCREENING FOR RISK OF PRE-TERM LABOR: ICD-10-CM

## 2024-08-27 DIAGNOSIS — E03.8 HYPOTHYROIDISM DUE TO HASHIMOTO'S THYROIDITIS: ICD-10-CM

## 2024-08-27 DIAGNOSIS — O99.282 HYPOTHYROIDISM AFFECTING PREGNANCY IN SECOND TRIMESTER: ICD-10-CM

## 2024-08-27 DIAGNOSIS — E06.3 HYPOTHYROIDISM DUE TO HASHIMOTO'S THYROIDITIS: ICD-10-CM

## 2024-08-27 DIAGNOSIS — O09.899 SHORT INTERVAL BETWEEN PREGNANCIES COMPLICATING PREGNANCY, ANTEPARTUM: ICD-10-CM

## 2024-08-27 LAB — TSH SERPL DL<=0.05 MIU/L-ACNC: 1.48 UIU/ML (ref 0.27–4.2)

## 2024-08-27 PROCEDURE — 76817 TRANSVAGINAL US OBSTETRIC: CPT | Performed by: OBSTETRICS & GYNECOLOGY

## 2024-08-27 PROCEDURE — 99999 PR OFFICE/OUTPT VISIT,PROCEDURE ONLY: CPT | Performed by: OBSTETRICS & GYNECOLOGY

## 2024-08-27 PROCEDURE — 76811 OB US DETAILED SNGL FETUS: CPT | Performed by: OBSTETRICS & GYNECOLOGY

## 2024-08-27 NOTE — PROGRESS NOTES
Please refer to non-invasive prenatal testing/NIPT results (with the [place] Complete test from [employer]).   Please refer to completed maternal carrier testing results (with the [place] test from [employer]) from prior pregnancy.     Options with respect to offering the patient invasive genetic prenatal testing and MSAFP screen could not be completed today, as the patient declines a Maternal-Fetal Medicine physician consultation today.     Advise MSAFP (maternal serum alpha-feto protein level) only lab blood draw in primary OB office (if not previously completed).     Patient declines a Maternal-Fetal Medicine complete physician consultation today regarding the fetal and/or maternal medical/obstetrical complications/co-morbidities of pregnancy.      Maternal-Fetal Medicine (MFM) attending physician will defer all management for these medical/obstetrical complications of pregnancy to the primary attending obstetrical physician/provider, as a result.  Therefore, only an ultrasound evaluation was completed today in the MFM office.      Please refer to Maternal-Fetal Medicine OBGYN resident progress note in EPIC.

## 2024-08-27 NOTE — PROGRESS NOTES
Obstetric/Gynecology Maternal Fetal Medicine Resident Note    Patient notified of ultrasound findings of complete placenta previa.     Patient declines formal consult with MFM attending physician for advanced maternal age and hypothyroidism.     Sanjuana Ruby DO  OBGYNARDA Resident, PGY2  [facility]  8/27/2024, 1:27 PM

## 2024-09-20 ENCOUNTER — ROUTINE PRENATAL (OUTPATIENT)
Dept: OBGYN | Age: 34
End: 2024-09-20

## 2024-09-20 VITALS
WEIGHT: 146 LBS | DIASTOLIC BLOOD PRESSURE: 67 MMHG | SYSTOLIC BLOOD PRESSURE: 99 MMHG | HEART RATE: 62 BPM | BODY MASS INDEX: 25.06 KG/M2

## 2024-09-20 DIAGNOSIS — O44.02 PLACENTA PREVIA IN SECOND TRIMESTER: ICD-10-CM

## 2024-09-20 DIAGNOSIS — Z3A.26 26 WEEKS GESTATION OF PREGNANCY: Primary | ICD-10-CM

## 2024-09-20 PROCEDURE — 0502F SUBSEQUENT PRENATAL CARE: CPT | Performed by: OBSTETRICS & GYNECOLOGY

## 2024-09-23 ENCOUNTER — HOSPITAL ENCOUNTER (OUTPATIENT)
Dept: PHYSICAL THERAPY | Facility: CLINIC | Age: 34
Setting detail: THERAPIES SERIES
Discharge: HOME | End: 2024-09-23
Payer: COMMERCIAL

## 2024-09-23 PROBLEM — O44.02 PLACENTA PREVIA IN SECOND TRIMESTER: Status: ACTIVE | Noted: 2024-09-23

## 2024-09-23 PROCEDURE — 97161 PT EVAL LOW COMPLEX 20 MIN: CPT

## 2024-09-23 PROCEDURE — 97140 MANUAL THERAPY 1/> REGIONS: CPT

## 2024-09-23 PROCEDURE — 97530 THERAPEUTIC ACTIVITIES: CPT

## 2024-09-27 ENCOUNTER — HOSPITAL ENCOUNTER (OUTPATIENT)
Age: 34
Setting detail: SPECIMEN
Discharge: HOME | End: 2024-09-27

## 2024-09-27 DIAGNOSIS — Z3A.26 26 WEEKS GESTATION OF PREGNANCY: ICD-10-CM

## 2024-09-27 LAB
BASOPHILS # BLD: <0.03 K/UL (ref 0–0.2)
BASOPHILS NFR BLD: 0 % (ref 0–2)
EOSINOPHIL # BLD: <0.03 K/UL (ref 0–0.44)
EOSINOPHILS RELATIVE PERCENT: 0 % (ref 1–4)
ERYTHROCYTE [DISTWIDTH] IN BLOOD BY AUTOMATED COUNT: 13.6 % (ref 11.8–14.4)
GLUCOSE 1H P 50 G GLC PO SERPL-MCNC: 59 MG/DL (ref 70–135)
GLUCOSE ADMINISTRATION: ABNORMAL
HCT VFR BLD AUTO: 36.8 % (ref 36.3–47.1)
HGB BLD-MCNC: 11.9 G/DL (ref 11.9–15.1)
IMM GRANULOCYTES # BLD AUTO: <0.03 K/UL (ref 0–0.3)
IMM GRANULOCYTES NFR BLD: 0 %
LYMPHOCYTES NFR BLD: 0.92 K/UL (ref 1.1–3.7)
LYMPHOCYTES RELATIVE PERCENT: 13 % (ref 24–43)
MCH RBC QN AUTO: 30.8 PG (ref 25.2–33.5)
MCHC RBC AUTO-ENTMCNC: 32.3 G/DL (ref 28.4–34.8)
MCV RBC AUTO: 95.3 FL (ref 82.6–102.9)
MONOCYTES NFR BLD: 0.37 K/UL (ref 0.1–1.2)
MONOCYTES NFR BLD: 5 % (ref 3–12)
NEUTROPHILS NFR BLD: 82 % (ref 36–65)
NEUTS SEG NFR BLD: 5.8 K/UL (ref 1.5–8.1)
NRBC BLD-RTO: 0 PER 100 WBC
PLATELET # BLD AUTO: 249 K/UL (ref 138–453)
PMV BLD AUTO: 11.7 FL (ref 8.1–13.5)
RBC # BLD AUTO: 3.86 M/UL (ref 3.95–5.11)
TSH SERPL DL<=0.05 MIU/L-ACNC: 1.16 UIU/ML (ref 0.27–4.2)
WBC OTHER # BLD: 7.2 K/UL (ref 3.5–11.3)

## 2024-09-29 LAB
MICROORGANISM SPEC CULT: ABNORMAL
SERVICE CMNT-IMP: ABNORMAL
SPECIMEN DESCRIPTION: ABNORMAL

## 2024-10-04 ENCOUNTER — HOSPITAL ENCOUNTER (OUTPATIENT)
Dept: PHYSICAL THERAPY | Facility: CLINIC | Age: 34
Setting detail: THERAPIES SERIES
Discharge: HOME | End: 2024-10-04
Payer: COMMERCIAL

## 2024-10-04 PROCEDURE — 97110 THERAPEUTIC EXERCISES: CPT

## 2024-10-04 PROCEDURE — 97140 MANUAL THERAPY 1/> REGIONS: CPT

## 2024-10-04 NOTE — FLOWSHEET NOTE
x weekly - 1 sets - 1 reps - 1 hold  - Seated Diaphragmatic Breathing  - 1 x daily - 7 x weekly - 1 sets - 10 reps  - Seated Transversus Abdominis Bracing  - 1 x daily - 7 x weekly - 1 sets - 10 reps  - Pelvic Muscle Energy Technique With Adduction and Abduction  - 1 x daily - 7 x weekly - 1-2 sets - 10 reps  - Cat-Camel  - 1 x daily - 7 x weekly - 1 sets - 10 reps  - Sitting to Supine Roll  - 1 x daily - 7 x weekly - 1 sets - 1 reps  - Hooklying Clamshell with Resistance  - 1 x daily - 7 x weekly - 10 reps  - Bent Knee Fallouts  - 1 x daily - 7 x weekly - 10 reps  - Seated Hamstring Stretch with Chair  - 1 x daily - 7 x weekly - 1 reps - 1 minute hold  - Seated Piriformis Stretch with Trunk Bend  - 1 x daily - 7 x weekly - 1 reps - 1 minute hold  - Pelvic Tilt on Swiss Ball  - 1 x daily - 7 x weekly - 10 reps  - Seated Lateral Pelvic Tilt on Swiss Ball  - 1 x daily - 7 x weekly - 10 reps  - Pelvic Circles on Swiss Ball  - 1 x daily - 7 x weekly - 10 reps  Patient Education  - Urinary Incontinence  - Office Posture  - Ice  - Get To Know Your Pelvic Floor- Female  Comprehension of Education:  [x] Verbalizes understanding.  [x] Demonstrates understanding.  [x] Needs review.  [x] Demonstrates/verbalizes HEP/Ed previously given.     Plan: [x] Continue current frequency toward long and short term goals.    [x] Specific Instructions for subsequent treatments: HEP review, assess PF excursion with external emg, log rolling review, MFR for abdominal fascial restrictions/MET, pelvic girdle stabilization, PF excursion/pelvic brace, B hip flexor flexibility ex, modalities prn       Time In:11:00am            Time Out: 11:52am    Electronically signed by:  DANIELE FERGUSON PTA

## 2024-10-08 ENCOUNTER — HOSPITAL ENCOUNTER (OUTPATIENT)
Dept: PHYSICAL THERAPY | Facility: CLINIC | Age: 34
Setting detail: THERAPIES SERIES
Discharge: HOME | End: 2024-10-08
Payer: COMMERCIAL

## 2024-10-08 ENCOUNTER — ROUTINE PRENATAL (OUTPATIENT)
Dept: PERINATAL CARE | Age: 34
End: 2024-10-08
Payer: COMMERCIAL

## 2024-10-08 VITALS
WEIGHT: 149.69 LBS | HEIGHT: 64 IN | DIASTOLIC BLOOD PRESSURE: 50 MMHG | RESPIRATION RATE: 16 BRPM | HEART RATE: 71 BPM | BODY MASS INDEX: 25.56 KG/M2 | TEMPERATURE: 97.6 F | SYSTOLIC BLOOD PRESSURE: 103 MMHG

## 2024-10-08 DIAGNOSIS — Z87.59 HISTORY OF PLACENTA PREVIA: ICD-10-CM

## 2024-10-08 DIAGNOSIS — O09.529 ANTEPARTUM MULTIGRAVIDA OF ADVANCED MATERNAL AGE: Primary | ICD-10-CM

## 2024-10-08 DIAGNOSIS — I73.00 RAYNAUD'S DISEASE WITHOUT GANGRENE: ICD-10-CM

## 2024-10-08 DIAGNOSIS — E06.3 HYPOTHYROIDISM DUE TO HASHIMOTO'S THYROIDITIS: ICD-10-CM

## 2024-10-08 PROCEDURE — 76816 OB US FOLLOW-UP PER FETUS: CPT | Performed by: OBSTETRICS & GYNECOLOGY

## 2024-10-08 PROCEDURE — 99999 PR OFFICE/OUTPT VISIT,PROCEDURE ONLY: CPT | Performed by: OBSTETRICS & GYNECOLOGY

## 2024-10-08 PROCEDURE — 76819 FETAL BIOPHYS PROFIL W/O NST: CPT | Performed by: OBSTETRICS & GYNECOLOGY

## 2024-10-08 PROCEDURE — 76817 TRANSVAGINAL US OBSTETRIC: CPT | Performed by: OBSTETRICS & GYNECOLOGY

## 2024-10-08 PROCEDURE — 97140 MANUAL THERAPY 1/> REGIONS: CPT

## 2024-10-08 PROCEDURE — 97110 THERAPEUTIC EXERCISES: CPT

## 2024-10-08 NOTE — FLOWSHEET NOTE
[x] [facility]  Outpatient Rehabilitation &  Therapy  [address]  P: [phone]  F: [phone]     Physical Therapy Daily Treatment Note    Date:  10/8/2024  Patient Name:  Cuca Sanon    :  1990  MRN: [id number]  Physician: Lucrecia Camacho                  Insurance: BCBS - PRIOR AUTH 5 vs 24-24 no biofeedback [id number]   Medical Diagnosis: R10.2 (ICD-10-CM) - Pelvic pain   Rehab Codes: R27.8, R29.3, M62.81, N39.46, R10.2, M99.05  Onset Date: 24     Next 's appt.: 10/8/24 - US   Visit# / total visits: 3/16 (5 visits approved per auth after eval) - at visit 5, reassess STGs & give Oswestry + ALAN   Cancels/No Shows: 0    Subjective:    Pain:  [x] Yes  [] No Location: LB and L sided pubic  Pain Rating: (0-10 scale) 2/10  Pain altered Tx:  [x] No  [] Yes  Action:    Comments: pt with overall no changes to her pubic pain complaints. Pt states she did run this weekend which seems to be more of an aggravator.     Objective:  Modalities:   Kinesiotape:  did not continue d/t irritation  Precautions: Pregnant - Due 25; Placenta Previa; H/O B Hip Labral Repair  Exercises: HEP Review - [employer] access code: [email or web address]  KT= Kinesiotape   PB= pelvic brace (DB-exhale+ TA contraction + kegel)  DB= diaphragmatic breathing  Exercise Reps/ Time Weight/ Level Comments   Nustep            Pelvic model & education    Analogies - IAP & use of pelvic brace   Diaphragm & pelvic floor relationship (verbal) in relation to pressure management & function     Squatty potty for all voiding   Move Legs as a unit; envisioning a pillow between the knees  log rolling    keep ribs over pelvis   lumbar roll - use towel  Serola Pelvic Belt  Fiber intake - increase     exhale with effort & movement     kinesiotape - waterproof 4-7days  - pressure/ pull to remove - ensure not too itchy or sweaty             Seated         1 Minute Guided Meditation  HEP       Diaphragmatic  cxr

## 2024-10-09 ENCOUNTER — ROUTINE PRENATAL (OUTPATIENT)
Dept: OBGYN | Age: 34
End: 2024-10-09

## 2024-10-09 VITALS
WEIGHT: 149 LBS | DIASTOLIC BLOOD PRESSURE: 68 MMHG | SYSTOLIC BLOOD PRESSURE: 101 MMHG | HEART RATE: 73 BPM | BODY MASS INDEX: 25.58 KG/M2

## 2024-10-09 DIAGNOSIS — Z3A.27 27 WEEKS GESTATION OF PREGNANCY: Primary | ICD-10-CM

## 2024-10-09 PROCEDURE — 0502F SUBSEQUENT PRENATAL CARE: CPT | Performed by: OBSTETRICS & GYNECOLOGY

## 2024-10-09 NOTE — PROGRESS NOTES
Prenatal Visit    Cuca Sanon is a 33 y.o. female  at 27w2d    The patient was seen and evaluated. She complains of nothing. She reports positive fetal movements. She denies contractions, vaginal bleeding and leakage of fluid. Signs and symptoms of labor and pre-eclampsia were reviewed with the patient in detail. She is to report any of these if they occur. She currently denies any of these.    The problem list reflects the active issues addressed during today's visit    Vitals:  BP: 101/68  Weight - Scale: 67.6 kg (149 lb)  Pulse: 73  Albumin: Negative  Glucose: Negative       Assessment & Plan:  Cuca Sanon is a 33 y.o. female  at 27w2d   - 28 week labs completed   - Tdap vaccination: declined    - MFM US reviewed  - BATSHEVA in 3 weeks        Patient Active Problem List    Diagnosis Date Noted    Advanced maternal age in multigravida 10/08/2024    Placenta previa in second trimester 2024    Elevated antinuclear antibody (MARLEY) level 10/30/2018    Hypothyroidism due to Hashimoto's thyroiditis 2018    Raynaud's disease without gangrene 2018    Iron deficiency anemia 2018         VERONICA CARL, DO  Ob/Gyn   [facility], [place]  10/9/2024, 3:21 PM

## 2024-10-22 ENCOUNTER — HOSPITAL ENCOUNTER (OUTPATIENT)
Dept: PHYSICAL THERAPY | Facility: CLINIC | Age: 34
Setting detail: THERAPIES SERIES
Discharge: HOME | End: 2024-10-22
Payer: COMMERCIAL

## 2024-10-22 PROCEDURE — 97110 THERAPEUTIC EXERCISES: CPT

## 2024-10-22 PROCEDURE — 97140 MANUAL THERAPY 1/> REGIONS: CPT

## 2024-10-22 NOTE — FLOWSHEET NOTE
[x] [facility]  Outpatient Rehabilitation &  Therapy  [address]  P: [phone]  F: [phone]     Physical Therapy Daily Treatment Note    Date:  10/22/2024  Patient Name:  Cuca Sanon    :  1990  MRN: [id number]  Physician: Lucrecia Camacho                  Insurance: BCBS - PRIOR AUTH 5 vs 24-24 no biofeedback [id number]   Medical Diagnosis: R10.2 (ICD-10-CM) - Pelvic pain   Rehab Codes: R27.8, R29.3, M62.81, N39.46, R10.2, M99.05  Onset Date: 24     Next 's appt.: 10/8/24 - US   Visit# / total visits:  (5 visits approved per auth after eval) - at visit 5, reassess STGs & give Oswestry + ALAN   Cancels/No Shows: 0    Subjective:    Pain:  [x] Yes  [] No Location: LB and L sided pubic  Pain Rating: (0-10 scale) 6-7/10  Pain altered Tx:  [x] No  [] Yes  Action:    Comments: Pt did have a recent US and confirmed she no longer has placenta previa. Pt states that she has had elevated pain since yesterday, more so in her groin vs pubic pain. Pain is B however greater on the L. Pt did run this am which has increased her pain.    Objective:  Modalities:   Kinesiotape:  did not continue d/t irritation  Precautions: Pregnant - Due 25; H/O B Hip Labral Repair  Exercises: HEP Review - [employer] access code: [email or web address]  KT= Kinesiotape   PB= pelvic brace (DB-exhale+ TA contraction + kegel)  DB= diaphragmatic breathing  Exercise Reps/ Time Weight/ Level Comments   Nustep            Pelvic model & education    Analogies - IAP & use of pelvic brace   Diaphragm & pelvic floor relationship (verbal) in relation to pressure management & function     Squatty potty for all voiding   Move Legs as a unit; envisioning a pillow between the knees  log rolling    keep ribs over pelvis   lumbar roll - use towel  Serola Pelvic Belt  Fiber intake - increase     exhale with effort & movement     kinesiotape - waterproof 4-7days  - pressure/ pull to remove - ensure not

## 2024-11-08 ENCOUNTER — HOSPITAL ENCOUNTER (OUTPATIENT)
Dept: PHYSICAL THERAPY | Facility: CLINIC | Age: 34
Setting detail: THERAPIES SERIES
Discharge: HOME | End: 2024-11-08
Payer: COMMERCIAL

## 2024-11-08 PROCEDURE — 97530 THERAPEUTIC ACTIVITIES: CPT

## 2024-11-08 PROCEDURE — 97140 MANUAL THERAPY 1/> REGIONS: CPT

## 2024-11-08 PROCEDURE — 97110 THERAPEUTIC EXERCISES: CPT

## 2024-11-08 NOTE — FLOWSHEET NOTE
[x] [facility]  Outpatient Rehabilitation &  Therapy  [address]  P: [phone]  F: [phone]     Physical Therapy Daily Treatment Note    Date:  2024  Patient Name:  Cuca Sanon    :  1990  MRN: [id number]  Physician: Lucrecia Camacho                  Insurance: BCBS - PRIOR AUTH 5 vs 24-24 no biofeedback [id number]   Medical Diagnosis: R10.2 (ICD-10-CM) - Pelvic pain   Rehab Codes: R27.8, R29.3, M62.81, N39.46, R10.2, M99.05  Onset Date: 24     Next 's appt.: 10/8/24 - US   Visit# / total visits:  (5 visits approved per auth after eval) - at visit 5, reassess STGs & give Oswestry + ALAN   Cancels/No Shows: 0    Subjective:    Pain:  [x] Yes  [] No Location: LB and L sided pubic  Pain Rating: (0-10 scale) 2/10 at this time  Pain altered Tx:  [x] No  [] Yes  Action:    Comments:Pt reports good tolerance while hiking on vacation with little pain. Did have some pain when flying and driving d/t prolonged sitting. Pt reports some tingling down B LEs when performing seated piriformis stretch which resolves once stretch is completed. Pt continues to run and is planning to run a 5k on [employer] Pt has had a cold this past week with increased sneezing and coughing. Pt reports increased urinary incontinence associated with this, but prior to the illness pt was having reduced leakage with carryover of the knack.     Objective:  Modalities:   Kinesiotape:  did not continue d/t irritation  Precautions: Pregnant - Due 25; H/O B Hip Labral Repair  Exercises: HEP Review - [employer] access code: [email or web address]  KT= Kinesiotape   PB= pelvic brace (DB-exhale+ TA contraction + kegel)  DB= diaphragmatic breathing  Exercise Reps/ Time Weight/ Level Comments   Nustep            Pelvic model & education    Analogies - IAP & use of pelvic brace   Diaphragm & pelvic floor relationship (verbal) in relation to pressure management & function     Deshawn islas

## 2024-11-12 ENCOUNTER — ROUTINE PRENATAL (OUTPATIENT)
Dept: PERINATAL CARE | Age: 34
End: 2024-11-12
Payer: COMMERCIAL

## 2024-11-12 VITALS
HEART RATE: 72 BPM | BODY MASS INDEX: 26.12 KG/M2 | WEIGHT: 153 LBS | HEIGHT: 64 IN | RESPIRATION RATE: 16 BRPM | DIASTOLIC BLOOD PRESSURE: 64 MMHG | SYSTOLIC BLOOD PRESSURE: 116 MMHG

## 2024-11-12 DIAGNOSIS — O09.899 SHORT INTERVAL BETWEEN PREGNANCIES COMPLICATING PREGNANCY, ANTEPARTUM: ICD-10-CM

## 2024-11-12 DIAGNOSIS — Z36.4 ULTRASOUND FOR ANTENATAL SCREENING FOR FETAL GROWTH RESTRICTION: ICD-10-CM

## 2024-11-12 DIAGNOSIS — O09.523 MULTIGRAVIDA OF ADVANCED MATERNAL AGE IN THIRD TRIMESTER: Primary | ICD-10-CM

## 2024-11-12 DIAGNOSIS — Z3A.32 32 WEEKS GESTATION OF PREGNANCY: ICD-10-CM

## 2024-11-12 DIAGNOSIS — O99.283 HYPOTHYROIDISM AFFECTING PREGNANCY IN THIRD TRIMESTER: ICD-10-CM

## 2024-11-12 DIAGNOSIS — Z36.3 ENCOUNTER FOR ROUTINE SCREENING FOR MALFORMATION USING ULTRASONICS: ICD-10-CM

## 2024-11-12 DIAGNOSIS — E03.9 HYPOTHYROIDISM AFFECTING PREGNANCY IN THIRD TRIMESTER: ICD-10-CM

## 2024-11-12 PROCEDURE — 76819 FETAL BIOPHYS PROFIL W/O NST: CPT | Performed by: OBSTETRICS & GYNECOLOGY

## 2024-11-12 PROCEDURE — 76805 OB US >/= 14 WKS SNGL FETUS: CPT | Performed by: OBSTETRICS & GYNECOLOGY

## 2024-11-12 PROCEDURE — 99999 PR OFFICE/OUTPT VISIT,PROCEDURE ONLY: CPT | Performed by: OBSTETRICS & GYNECOLOGY

## 2024-11-22 ENCOUNTER — HOSPITAL ENCOUNTER (OUTPATIENT)
Dept: PHYSICAL THERAPY | Facility: CLINIC | Age: 34
Setting detail: THERAPIES SERIES
Discharge: HOME | End: 2024-11-22
Payer: COMMERCIAL

## 2024-11-22 PROCEDURE — 97110 THERAPEUTIC EXERCISES: CPT

## 2024-11-22 PROCEDURE — 97140 MANUAL THERAPY 1/> REGIONS: CPT

## 2024-11-22 NOTE — FLOWSHEET NOTE
endurance 8 seconds or greater for improved pelvic health function & to reduce UI with running, if applicable  5. Pt will report 3 points or less on Oswestry LBP Scale & 3 points or less on ALAN to indicate improved lumbo-pelvic health function NOT MET, pt scores regressed, likely due to evolving pregnancy; Oswestry LBP Scale: 18 points, 36% deficit; Urogenital Distress Inventory (ALAN): 10 points, 41.7% deficit - 11/8/24  6. Pt will report improved sleep by 90% perceived improvement to indicate pain reduction & optimal sleep function for >5hrs  7. Pt will demo B SLS of >30sec to demonstrate good SL balance with proper load management with 0 verbal cues from provider     Patient goals: \"improve pain, so it is hopefully, not as bad after delivery \"    Pt. Education:  [x] Yes  [] No  [x] Reviewed Prior HEP/Ed  Method of Education: [x] Verbal-   [x] Demo  [] Written- added bolded below  Access Code: [email or web address]  URL: [email or web address]  Date: 11/08/2024  Prepared by: Irma Shah  Program Notes  Squatty potty for all voidingMove Legs as a unit; envisioning a pillow between the kneeslog rolling  keep ribs over pelvis lumbar roll - use towelSerola Pelvic BeltFiber intake - increase exhale with effort & movement kinesiotape - waterproof 4-7days  - pressure/ pull to remove - ensure not too itchy or sweaty  Exercises  -  1 Minute Guided Meditation  - 1 x daily - 7 x weekly - 1 sets - 1 reps - 1 hold  - Seated Diaphragmatic Breathing  - 1 x daily - 7 x weekly - 1 sets - 10 reps  - Seated Transversus Abdominis Bracing  - 1 x daily - 7 x weekly - 1 sets - 10 reps  - Pelvic Muscle Energy Technique With Adduction and Abduction  - 1 x daily - 7 x weekly - 1-2 sets - 10 reps  - Cat-Camel  - 1 x daily - 7 x weekly - 1 sets - 10 reps  - Hip Hinge Rock Back  - 1 x daily - 7 x weekly - 3 sets - 10 reps  - Kneeling Adductor Stretch with Hip External Rotation  - 1 x daily - 7 x weekly - 10 reps  - Diaphragmatic Breathing in

## 2024-11-27 ENCOUNTER — ROUTINE PRENATAL (OUTPATIENT)
Dept: OBGYN | Age: 34
End: 2024-11-27

## 2024-11-27 VITALS
WEIGHT: 155.6 LBS | DIASTOLIC BLOOD PRESSURE: 65 MMHG | BODY MASS INDEX: 26.71 KG/M2 | SYSTOLIC BLOOD PRESSURE: 103 MMHG | HEART RATE: 66 BPM

## 2024-11-27 DIAGNOSIS — Z3A.34 34 WEEKS GESTATION OF PREGNANCY: ICD-10-CM

## 2024-11-27 DIAGNOSIS — R82.71 GBS BACTERIURIA: Primary | ICD-10-CM

## 2024-11-27 DIAGNOSIS — Z34.93 PRENATAL CARE IN THIRD TRIMESTER: ICD-10-CM

## 2024-11-27 PROCEDURE — 0502F SUBSEQUENT PRENATAL CARE: CPT | Performed by: STUDENT IN AN ORGANIZED HEALTH CARE EDUCATION/TRAINING PROGRAM

## 2024-11-27 NOTE — PROGRESS NOTES
Prenatal Visit    Cuca Sanon is a 34 y.o. female  at 34w2d IUP    The patient was seen and evaluated. She has no complaints today. She reports positive fetal movements. She denies contractions, vaginal bleeding and leakage of fluid. Signs and symptoms of labor and pre-eclampsia were reviewed with the patient in detail. She is to report any of these if they occur. She currently denies any of these.    Patient to have some questions today about fetal kick counts and fetal movement.  Patient is also experiencing some numbness and tingling in her hands that seems to come on more rapidly with certain positions and movement.  Patient also had questions about sleeping on her back.    The problem list reflects the active issues addressed during today's visit    Vitals:  BP: 103/65  Weight - Scale: 70.6 kg (155 lb 9.6 oz)  Pulse: 66  Albumin: Negative  Glucose: Negative  Fundal Height (cm): 34 cm  Fetal HR: 145  Movement: Present     PHYSICAL:   General appearance: no apparent distress, alert and cooperative  HEENT: head atraumatic, normocephalic, trachea midline, moist mucous membranes   Neurologic: alert, oriented, normal speech   Lungs: no increased work of breathing,   Abdomen: soft, gravid, non-tender on palpation    Musculoskeletal: no gross abnormalities, range of motion appropriate for age   Psychiatric: mood appropriate, normal affect      Assessment & Plan:  Cuca Sanon is a 34 y.o. female  at 34w2d   - VSS    - 28 week labs completed   - Continue taking prenatal vitamins QD    - Declined all vaccines in pregnancy   -  testing indication starting 32 weeks GA: N/A   - Indications for  section: N/A   -Patient planning for spontaneous labor with Nubain for pain control.  She is in talks with her  about planning.  Did discuss stretches for sciatic pain, and website will not help for stretches in pregnancy.  Instructed patient to not sleep on her back when she is able as we

## 2024-12-06 ENCOUNTER — HOSPITAL ENCOUNTER (OUTPATIENT)
Dept: PHYSICAL THERAPY | Facility: CLINIC | Age: 34
Setting detail: THERAPIES SERIES
Discharge: HOME | End: 2024-12-06
Payer: COMMERCIAL

## 2024-12-06 PROCEDURE — 97110 THERAPEUTIC EXERCISES: CPT

## 2024-12-06 PROCEDURE — 97140 MANUAL THERAPY 1/> REGIONS: CPT

## 2024-12-06 PROCEDURE — 97530 THERAPEUTIC ACTIVITIES: CPT

## 2024-12-06 NOTE — FLOWSHEET NOTE
[x] [facility]  Outpatient Rehabilitation &  Therapy  [address]  P: [phone]  F: [phone]     Physical Therapy Daily Treatment Note    Date:  2024  Patient Name:  Cuca Sanon    :  1990  MRN: [id number]  Physician: Lucrecia Camacho                  Insurance: BCBS - PRIOR AUTH 5 vs 24-24 no biofeedback [id number], [id number] , 6 additional visits approved through 25  Medical Diagnosis: R10.2 (ICD-10-CM) - Pelvic pain   Rehab Codes: R27.8, R29.3, M62.81, N39.46, R10.2, M99.05  Onset Date: 24     Next 's appt.: 10/8/24 -    Visit# / total visits:  (6 additional visits approved through 25 for a total of 11 visits)   Cancels/No Shows: 0    Subjective:    Pain:  [x] Yes  [] No Location: LB and sacrum  Pain Rating: (0-10 scale) 0/10 at rest, 2/10 with walking/activity  Pain altered Tx:  [x] No  [] Yes  Action:    Comments: pt reports less pubic pain since reducing her higher level activities. Pain overall is less, but more isolated to the LB and sacrum. At this time pt has little pain but reports elevated pain last evening.     Objective:  Modalities:   Kinesiotape:  did not continue d/t irritation  Precautions: Pregnant - Due 25; H/O B Hip Labral Repair  Exercises: HEP Review - [email or web address] access code: [email or web address]  KT= Kinesiotape   PB= pelvic brace (DB-exhale+ TA contraction + kegel)  DB= diaphragmatic breathing  Exercise Reps/ Time Weight/ Level Comments   Nustep            Pelvic model & education    Analogies - IAP & use of pelvic brace   Diaphragm & pelvic floor relationship (verbal) in relation to pressure management & function     Squatty potty for all voiding   Move Legs as a unit; envisioning a pillow between the knees  log rolling    keep ribs over pelvis   lumbar roll - use towel  Serola Pelvic Belt  Fiber intake - increase     exhale with effort & movement     kinesiotape - waterproof 4-7days  - pressure/ pull to remove -

## 2024-12-13 ENCOUNTER — HOSPITAL ENCOUNTER (OUTPATIENT)
Dept: PHYSICAL THERAPY | Facility: CLINIC | Age: 34
Setting detail: THERAPIES SERIES
Discharge: HOME | End: 2024-12-13
Payer: COMMERCIAL

## 2024-12-13 PROCEDURE — 97112 NEUROMUSCULAR REEDUCATION: CPT

## 2024-12-13 PROCEDURE — 97530 THERAPEUTIC ACTIVITIES: CPT

## 2024-12-13 NOTE — FLOWSHEET NOTE
[x] [facility]  Outpatient Rehabilitation &  Therapy  [address]  P: [phone]  F: [phone]     Physical Therapy Daily Treatment Note    Date:  2024  Patient Name:  Cuca Sanon    :  1990  MRN: [id number]  Physician: Lucrecia Camacho                  Insurance: BCBS - PRIOR AUTH 5 vs 24-24 no biofeedback [id number], [id number] , 6 additional visits approved through 25  Medical Diagnosis: R10.2 (ICD-10-CM) - Pelvic pain   Rehab Codes: R27.8, R29.3, M62.81, N39.46, R10.2, M99.05  Onset Date: 24     Next 's appt.: 10/8/24 -    Visit# / total visits:  (6 additional visits approved through 25 for a total of 11 visits)   Cancels/No Shows: 0    Subjective:    Pain:  [x] Yes  [] No Location: LB and sacrum  Pain Rating: (0-10 scale) 0/10 at rest  Pain altered Tx:  [x] No  [] Yes  Action:    Comments: Pt with consistently improved pain.    Objective:  Consent: Patient verbally consented to external sEMG assessment with no red flags present. Patient was appropriately draped and only areas that were being treated were exposed. Therapist provided detailed explanation of treatment prior to initiation of session. Patient verbalized and demonstrated understanding and provided verbal consent. Consent was checked and received prior to initiating different treatment techniques and checked frequently throughout session.   Modalities:   Precautions:   Exercises:  Exercise Reps/ Time Weight/ Level Comments   Push mechanics training via sEMG external placement  48 min    1) diaphragmatic breathing  2) diaphragmatic breathing with TrA activation  3) Diaphragmatic breathing with TrA activation + bear down/pelvic floor lengthening     Initial resting tone of pelvic floor in R side lying 4.3 microvolts     Positions practiced: sitting on stability ball, standing with mat support, standing with ball support, quadruped with ball support, side lying, side lying

## 2024-12-20 ENCOUNTER — APPOINTMENT (OUTPATIENT)
Dept: PHYSICAL THERAPY | Facility: CLINIC | Age: 34
End: 2024-12-20
Payer: COMMERCIAL

## 2024-12-23 ENCOUNTER — HOSPITAL ENCOUNTER (OUTPATIENT)
Age: 34
Setting detail: SPECIMEN
Discharge: HOME | End: 2024-12-23

## 2024-12-23 DIAGNOSIS — Z3A.38 38 WEEKS GESTATION OF PREGNANCY: Primary | ICD-10-CM

## 2024-12-23 DIAGNOSIS — E06.3 HYPOTHYROIDISM DUE TO HASHIMOTO'S THYROIDITIS: ICD-10-CM

## 2024-12-23 RX ORDER — LEVOTHYROXINE SODIUM 75 UG/1
37.5 TABLET ORAL DAILY
Qty: 90 TABLET | Refills: 5 | Status: SHIPPED | OUTPATIENT
Start: 2024-12-23

## 2024-12-24 DIAGNOSIS — Z3A.38 38 WEEKS GESTATION OF PREGNANCY: ICD-10-CM

## 2024-12-28 LAB
MICROORGANISM SPEC CULT: ABNORMAL
SERVICE CMNT-IMP: ABNORMAL
SPECIMEN DESCRIPTION: ABNORMAL

## 2025-01-08 ENCOUNTER — HOSPITAL ENCOUNTER (INPATIENT)
Age: 35
LOS: 2 days | Discharge: HOME | End: 2025-01-10
Attending: OBSTETRICS & GYNECOLOGY | Admitting: OBSTETRICS & GYNECOLOGY
Payer: COMMERCIAL

## 2025-01-08 ENCOUNTER — TELEPHONE (OUTPATIENT)
Dept: OBGYN | Age: 35
End: 2025-01-08

## 2025-01-08 PROBLEM — Z3A.40 40 WEEKS GESTATION OF PREGNANCY: Status: ACTIVE | Noted: 2025-01-08

## 2025-01-08 LAB
ABO + RH BLD: NORMAL
ARM BAND NUMBER: NORMAL
BASOPHILS # BLD: 0.03 K/UL (ref 0–0.2)
BASOPHILS NFR BLD: 0 % (ref 0–2)
BLOOD BANK SAMPLE EXPIRATION: NORMAL
BLOOD GROUP ANTIBODIES SERPL: NEGATIVE
EOSINOPHIL # BLD: 0.04 K/UL (ref 0–0.44)
EOSINOPHILS RELATIVE PERCENT: 0 % (ref 1–4)
ERYTHROCYTE [DISTWIDTH] IN BLOOD BY AUTOMATED COUNT: 13.6 % (ref 11.8–14.4)
HCT VFR BLD AUTO: 38.7 % (ref 36.3–47.1)
HGB BLD-MCNC: 13.2 G/DL (ref 11.9–15.1)
IMM GRANULOCYTES # BLD AUTO: 0.03 K/UL (ref 0–0.3)
IMM GRANULOCYTES NFR BLD: 0 %
LYMPHOCYTES NFR BLD: 1.47 K/UL (ref 1.1–3.7)
LYMPHOCYTES RELATIVE PERCENT: 13 % (ref 24–43)
MCH RBC QN AUTO: 30.8 PG (ref 25.2–33.5)
MCHC RBC AUTO-ENTMCNC: 34.1 G/DL (ref 28.4–34.8)
MCV RBC AUTO: 90.2 FL (ref 82.6–102.9)
MONOCYTES NFR BLD: 0.53 K/UL (ref 0.1–1.2)
MONOCYTES NFR BLD: 5 % (ref 3–12)
NEUTROPHILS NFR BLD: 82 % (ref 36–65)
NEUTS SEG NFR BLD: 9.52 K/UL (ref 1.5–8.1)
NRBC BLD-RTO: 0 PER 100 WBC
PLATELET # BLD AUTO: 222 K/UL (ref 138–453)
PMV BLD AUTO: 10 FL (ref 8.1–13.5)
RBC # BLD AUTO: 4.29 M/UL (ref 3.95–5.11)
T PALLIDUM AB SER QL IA: NONREACTIVE
WBC OTHER # BLD: 11.6 K/UL (ref 3.5–11.3)

## 2025-01-08 PROCEDURE — 86901 BLOOD TYPING SEROLOGIC RH(D): CPT

## 2025-01-08 PROCEDURE — 2580000003 HC RX 258

## 2025-01-08 PROCEDURE — 6360000002 HC RX W HCPCS

## 2025-01-08 PROCEDURE — 85025 COMPLETE CBC W/AUTO DIFF WBC: CPT

## 2025-01-08 PROCEDURE — 1220000000 HC SEMI PRIVATE OB R&B

## 2025-01-08 PROCEDURE — 86900 BLOOD TYPING SEROLOGIC ABO: CPT

## 2025-01-08 PROCEDURE — 7200000001 HC VAGINAL DELIVERY

## 2025-01-08 PROCEDURE — 0UQMXZZ REPAIR VULVA, EXTERNAL APPROACH: ICD-10-PCS | Performed by: OBSTETRICS & GYNECOLOGY

## 2025-01-08 PROCEDURE — 6360000002 HC RX W HCPCS: Performed by: OBSTETRICS & GYNECOLOGY

## 2025-01-08 PROCEDURE — 86780 TREPONEMA PALLIDUM: CPT

## 2025-01-08 PROCEDURE — 86850 RBC ANTIBODY SCREEN: CPT

## 2025-01-08 RX ORDER — SODIUM CHLORIDE 9 MG/ML
INJECTION, SOLUTION INTRAVENOUS PRN
Status: DISCONTINUED | OUTPATIENT
Start: 2025-01-08 | End: 2025-01-09

## 2025-01-08 RX ORDER — SIMETHICONE 80 MG
80 TABLET,CHEWABLE ORAL EVERY 6 HOURS PRN
Status: DISCONTINUED | OUTPATIENT
Start: 2025-01-08 | End: 2025-01-10 | Stop reason: HOSPADM

## 2025-01-08 RX ORDER — DIPHENHYDRAMINE HCL 25 MG
25 TABLET ORAL EVERY 4 HOURS PRN
Status: DISCONTINUED | OUTPATIENT
Start: 2025-01-08 | End: 2025-01-10 | Stop reason: HOSPADM

## 2025-01-08 RX ORDER — NALBUPHINE HYDROCHLORIDE 10 MG/ML
10 INJECTION INTRAMUSCULAR; INTRAVENOUS; SUBCUTANEOUS ONCE
Status: COMPLETED | OUTPATIENT
Start: 2025-01-08 | End: 2025-01-08

## 2025-01-08 RX ORDER — ACETAMINOPHEN 500 MG
1000 TABLET ORAL EVERY 6 HOURS PRN
Status: DISCONTINUED | OUTPATIENT
Start: 2025-01-08 | End: 2025-01-10 | Stop reason: HOSPADM

## 2025-01-08 RX ORDER — SODIUM CHLORIDE 0.9 % (FLUSH) 0.9 %
5-40 SYRINGE (ML) INJECTION EVERY 12 HOURS SCHEDULED
Status: DISCONTINUED | OUTPATIENT
Start: 2025-01-08 | End: 2025-01-09

## 2025-01-08 RX ORDER — LIDOCAINE HYDROCHLORIDE 10 MG/ML
INJECTION, SOLUTION INFILTRATION; PERINEURAL
Status: COMPLETED
Start: 2025-01-08 | End: 2025-01-08

## 2025-01-08 RX ORDER — DIPHENHYDRAMINE HYDROCHLORIDE 50 MG/ML
25 INJECTION INTRAMUSCULAR; INTRAVENOUS EVERY 4 HOURS PRN
Status: DISCONTINUED | OUTPATIENT
Start: 2025-01-08 | End: 2025-01-09

## 2025-01-08 RX ORDER — SODIUM CHLORIDE, SODIUM LACTATE, POTASSIUM CHLORIDE, CALCIUM CHLORIDE 600; 310; 30; 20 MG/100ML; MG/100ML; MG/100ML; MG/100ML
INJECTION, SOLUTION INTRAVENOUS CONTINUOUS
Status: DISCONTINUED | OUTPATIENT
Start: 2025-01-08 | End: 2025-01-09

## 2025-01-08 RX ADMIN — SODIUM CHLORIDE, SODIUM LACTATE, POTASSIUM CHLORIDE, AND CALCIUM CHLORIDE: .6; .31; .03; .02 INJECTION, SOLUTION INTRAVENOUS at 22:10

## 2025-01-08 RX ADMIN — NALBUPHINE HYDROCHLORIDE 10 MG: 10 INJECTION, SOLUTION INTRAMUSCULAR; INTRAVENOUS; SUBCUTANEOUS at 22:52

## 2025-01-08 RX ADMIN — Medication 166.7 ML: at 23:28

## 2025-01-08 RX ADMIN — PENICILLIN G POTASSIUM 5 MILLION UNITS: 5000000 INJECTION, POWDER, FOR SOLUTION INTRAMUSCULAR; INTRAVENOUS at 22:10

## 2025-01-08 RX ADMIN — LIDOCAINE HYDROCHLORIDE: 10 INJECTION, SOLUTION INFILTRATION; PERINEURAL at 23:28

## 2025-01-08 ASSESSMENT — PAIN DESCRIPTION - LOCATION: LOCATION: ABDOMEN

## 2025-01-08 ASSESSMENT — PAIN SCALES - GENERAL: PAINLEVEL_OUTOF10: 7

## 2025-01-08 NOTE — TELEPHONE ENCOUNTER
Patient seen and examined. Doing well, no complaints. No consistent contractions.     BP: 117/68  P 82  FHT: 135 by US     BPP: 8/8     SVE: 2-3/70/-2, membrane sweep performed       Patient declines IOL and wants to continue with expectant management. Labor precautions reviewed.     Lucrecia Camacho,   1/8/2025, 2:28 PM

## 2025-01-09 PROCEDURE — 1220000000 HC SEMI PRIVATE OB R&B

## 2025-01-09 PROCEDURE — 6370000000 HC RX 637 (ALT 250 FOR IP)

## 2025-01-09 RX ORDER — SODIUM CHLORIDE, SODIUM LACTATE, POTASSIUM CHLORIDE, AND CALCIUM CHLORIDE .6; .31; .03; .02 G/100ML; G/100ML; G/100ML; G/100ML
500 INJECTION, SOLUTION INTRAVENOUS PRN
Status: DISCONTINUED | OUTPATIENT
Start: 2025-01-09 | End: 2025-01-10 | Stop reason: HOSPADM

## 2025-01-09 RX ORDER — ACETAMINOPHEN 500 MG
1000 TABLET ORAL EVERY 8 HOURS SCHEDULED
Status: DISCONTINUED | OUTPATIENT
Start: 2025-01-09 | End: 2025-01-10 | Stop reason: HOSPADM

## 2025-01-09 RX ORDER — IBUPROFEN 600 MG/1
600 TABLET, FILM COATED ORAL EVERY 6 HOURS PRN
Qty: 60 TABLET | Refills: 1 | Status: SHIPPED | OUTPATIENT
Start: 2025-01-09

## 2025-01-09 RX ORDER — LEVOTHYROXINE SODIUM 75 UG/1
37.5 TABLET ORAL DAILY
Status: DISCONTINUED | OUTPATIENT
Start: 2025-01-09 | End: 2025-01-10 | Stop reason: HOSPADM

## 2025-01-09 RX ORDER — BISACODYL 10 MG
10 SUPPOSITORY, RECTAL RECTAL DAILY PRN
Status: DISCONTINUED | OUTPATIENT
Start: 2025-01-09 | End: 2025-01-10 | Stop reason: HOSPADM

## 2025-01-09 RX ORDER — ACETAMINOPHEN 500 MG
1000 TABLET ORAL EVERY 6 HOURS PRN
Qty: 120 TABLET | Refills: 1 | Status: SHIPPED | OUTPATIENT
Start: 2025-01-09

## 2025-01-09 RX ORDER — LANOLIN
CREAM (ML) TOPICAL PRN
Status: DISCONTINUED | OUTPATIENT
Start: 2025-01-09 | End: 2025-01-10 | Stop reason: HOSPADM

## 2025-01-09 RX ORDER — SODIUM CHLORIDE 0.9 % (FLUSH) 0.9 %
5-40 SYRINGE (ML) INJECTION PRN
Status: DISCONTINUED | OUTPATIENT
Start: 2025-01-09 | End: 2025-01-10 | Stop reason: HOSPADM

## 2025-01-09 RX ORDER — IBUPROFEN 800 MG/1
800 TABLET, FILM COATED ORAL EVERY 8 HOURS SCHEDULED
Status: DISCONTINUED | OUTPATIENT
Start: 2025-01-09 | End: 2025-01-10 | Stop reason: HOSPADM

## 2025-01-09 RX ORDER — DOCUSATE SODIUM 100 MG/1
100 CAPSULE, LIQUID FILLED ORAL 2 TIMES DAILY
Status: DISCONTINUED | OUTPATIENT
Start: 2025-01-09 | End: 2025-01-10 | Stop reason: HOSPADM

## 2025-01-09 RX ORDER — ONDANSETRON 2 MG/ML
4 INJECTION INTRAMUSCULAR; INTRAVENOUS EVERY 6 HOURS PRN
Status: DISCONTINUED | OUTPATIENT
Start: 2025-01-09 | End: 2025-01-10 | Stop reason: HOSPADM

## 2025-01-09 RX ORDER — SENNA AND DOCUSATE SODIUM 50; 8.6 MG/1; MG/1
1 TABLET, FILM COATED ORAL DAILY
Qty: 60 TABLET | Refills: 1 | Status: SHIPPED | OUTPATIENT
Start: 2025-01-09

## 2025-01-09 RX ORDER — ONDANSETRON 4 MG/1
4 TABLET, ORALLY DISINTEGRATING ORAL EVERY 6 HOURS PRN
Status: DISCONTINUED | OUTPATIENT
Start: 2025-01-09 | End: 2025-01-10 | Stop reason: HOSPADM

## 2025-01-09 RX ADMIN — LEVOTHYROXINE SODIUM 37.5 MCG: 75 TABLET ORAL at 07:14

## 2025-01-09 ASSESSMENT — PAIN SCALES - GENERAL: PAINLEVEL_OUTOF10: 0

## 2025-01-09 NOTE — CONSULTS
Lactation round made. Patient reports breastfeeding is going well, reports strong tug. Patient states baby is in sleepy phase at the moment. Encouraged STS and offering breast every 2 hours. Has a breast pump. Encouraged to call out.

## 2025-01-09 NOTE — H&P
OBSTETRICAL HISTORY AND PHYSICAL  [facility]    Date: 2025       Time: 10:04 PM   Patient Name: Cuca Sanon     Patient : 1990  Room/Bed: 0708/0708-01    Admission Date/Time: 2025  9:46 PM      CC: Large gush of fluid at 8pm, clear, blood tinged uncomfortable regular contractions.    HPI: Cuca Sanon is a 34 y.o.  at 40w2d spontaneous rupture of membranes in active labor.     DATING:  LMP: Patient's last menstrual period was 2024.  Estimated Date of Delivery: 25     PREGNANCY RISK FACTORS:  Patient Active Problem List   Diagnosis    Hypothyroidism due to Hashimoto's thyroiditis    Raynaud's disease without gangrene    Iron deficiency anemia    Elevated antinuclear antibody (MARLEY) level    Placenta previa in second trimester    Advanced maternal age in multigravida    GBS bacteriuria    40 weeks gestation of pregnancy        Steroids Given In This Pregnancy:      REVIEW OF SYSTEMS:   Constitutional: negative fever, negative chills, negative weight changes   HEENT: negative visual disturbances, negative headaches, negative dizziness, negative hearing loss  Breast: Negative breast abnormalities, negative breast lumps, negative nipple discharge  Respiratory: negative dyspnea, negative cough, negative SOB  Cardiovascular: negative chest pain,  negative palpitations, negative arrhythmia, negative syncope   Gastrointestinal: negative abdominal pain, negative RUQ pain, negative N/V, negative diarrhea, negative constipation, negative bowel changes, negative heartburn   Genitourinary: negative dysuria, negative hematuria, negative urinary incontinence, negative vaginal discharge, negative vaginal bleeding or spotting  Dermatological: negative rash, negative pruritis, negative mole or other skin changes  Hematologic: negative bruising  Immunologic/Lymphatic: negative recent illness, negative recent sick contact  Musculoskeletal: negative back pain, negative  Normal hair distribution, normal appearing vulva, right labial varicosities, normal clitoris   Vagina: Normal appearing vaginal mucosa without lesions, grossly ruptured, bloody mucus vaginal discharge noted in the posterior vault, no lacerations   Cervix: Normal appearing cervix without lesions, external os visibly closed, no lacerations or abnormal lesions visualized    Sterile Vaginal Exam:  Cervix: 4 cm dilated, 90 % effaced, 0 station, mid position (out of 3 station), soft consistency, , Membranes intact, ruptured,        0 1 2 3   Position Posterior Intermediate Anterior -   Consistency Firm Intermediate Soft -   Effacement 0-30% 31-50% 51-80% >80%   Dilation 0cm 1-2cm 3-4cm >5cm   Fetal Station -3 -2 -1, 0 +1, +2     DATA:  Membranes Ruptured: Yes: grossly        PRENATAL LAB RESULTS:  Blood Type/Rh: A pos  Antibody Screen: negative  Hemoglobin, Hematocrit, Platelets: 11.9/36.8 < 249  Rubella: immune  T. Pallidum, IgG: non-reactive  Hepatitis B Surface Antigen: non-reactive   Hepatitis C Antibody: non-reactive   HIV: non-reactive   Urine culture: negative, date: 2024    Early 1 hour Glucose Tolerance Test: 59      Group B Strep: positive on 2024  Non-Invasive Prenatal Testing: no aneuploidy    ASSESSMENT & PLAN:  Cuca Sanon is a 34 y.o. female  at 40w2d in active labor    - GBS positive w/ PCN for prophylaxis   -Intermittent monitoring   -Pain control as needed   -Anticipate     Patient Active Problem List    Diagnosis Date Noted    40 weeks gestation of pregnancy 2025    GBS bacteriuria 2024    Advanced maternal age in multigravida 10/08/2024    Placenta previa in second trimester 2024    Elevated antinuclear antibody (MARLEY) level 10/30/2018    Hypothyroidism due to Hashimoto's thyroiditis 2018    Raynaud's disease without gangrene 2018    Iron deficiency anemia 2018         Risks, benefits, alternatives and possible complications have been

## 2025-01-09 NOTE — CARE COORDINATION
CASE MANAGEMENT POST-PARTUM TRANSITIONAL CARE PLAN    40 weeks gestation of pregnancy [Z3A.40]    OB Provider: Dr. Camacho    Writer met w/ Cuca at her bedside to discuss DCP. She is S/P  on 25 @ 40w2d at 2320 of female infant    Writer verified address,her phone number and emergency contacts are all correct on facesheet.. She states she lives with her  and their other child. She denied barriers with transportation home, to doctor's appointments or with paying for medications upon discharge home.     BCBS PPO insurance correct. Writer notified her they have 30 days from date of birth to add  to insurance policy. She verbalized understanding.    Infant name on BC: Alejandra Harrison.   Infant PCP [facility].     DME: None  HOME CARE: None    Anticipate DC home of couplet in private vehicle in 1-2 days status post vaginal delivery.        [facility] RISK OF UNPLANNED READMISSION 2.0             1.7 Total Score

## 2025-01-09 NOTE — PLAN OF CARE
Problem: Pain  Goal: Verbalizes/displays adequate comfort level or baseline comfort level  Outcome: Progressing     Problem: Vaginal Birth or  Section  Goal: Fetal and maternal status remain reassuring during the birth process  Outcome: Completed     Problem: Postpartum  Goal: Experiences normal postpartum course  Outcome: Progressing  Goal: Appropriate maternal -  bonding  Outcome: Progressing  Goal: Establishment of infant feeding pattern  Outcome: Progressing  Goal: Incisions, wounds, or drain sites healing without S/S of infection  Outcome: Progressing     Problem: Infection - Adult  Goal: Absence of infection at discharge  Outcome: Progressing  Goal: Absence of infection during hospitalization  Outcome: Progressing  Goal: Absence of fever/infection during anticipated neutropenic period  Outcome: Progressing     Problem: Safety - Adult  Goal: Free from fall injury  Outcome: Progressing     Problem: Discharge Planning  Goal: Discharge to home or other facility with appropriate resources  Outcome: Progressing     Problem: Chronic Conditions and Co-morbidities  Goal: Patient's chronic conditions and co-morbidity symptoms are monitored and maintained or improved  Outcome: Progressing

## 2025-01-09 NOTE — DISCHARGE SUMMARY
Obstetric Discharge Summary  [facility]    Patient Name: Cuca Sanon  Patient : 1990  Primary Care Physician: Toby Garcia MD  Admit Date: 2025    Principal Diagnosis: IUP at 40w2d, admitted for Spontaneous Labor     Her pregnancy has been complicated by:   Patient Active Problem List   Diagnosis    Hypothyroidism due to Hashimoto's thyroiditis    Raynaud's disease without gangrene    Iron deficiency anemia    Elevated antinuclear antibody (MARLEY) level    Placenta previa in second trimester    Advanced maternal age in multigravida    GBS bacteriuria    40 weeks gestation of pregnancy     25 F Apg 8, Wt ?       Infection Present?: No  Hospital Acquired: No    Surgical Operations & Procedures:  Analgesia: none  Delivery Type: Spontaneous Vaginal Delivery: See Labor and Delivery Summary   Laceration(s): midline periurethral    Consultations: none    Pertinent Findings & Procedures:   Cuca Sanon is a 34 y.o. female  at 40w2d admitted for spontaneous labor; received nubain.     She delivered by spontaneous vaginal a Live Born infant on 25.       Information for the patient's :  Erinn Sanon [[id number]]   female   Birth Weight: N/A    Apgars: 8 at 1 minute and 9 at 5 minutes.     Postpartum course: normal.      Course of patient: uncomplicated    Discharge to: Home    Readmission planned: no     Indication for 6 week PP 2 hour GTT?: no     Eligible for 2 week PP virtual visit? yes    Contraception: no method    Recommendations on Discharge:     Medications:      Medication List        START taking these medications      acetaminophen 500 MG tablet  Commonly known as: TYLENOL  Take 2 tablets by mouth every 6 hours as needed for Pain     ibuprofen 600 MG tablet  Commonly known as: ADVIL;MOTRIN  Take 1 tablet by mouth every 6 hours as needed for Pain     sennosides-docusate sodium 8.6-50 MG tablet  Commonly known as: SENOKOT-S  Take 1 tablet by

## 2025-01-09 NOTE — PROGRESS NOTES
POST PARTUM DAY # 1    Cuca Sanon is a 34 y.o. female  This patient was seen & examined today.     Her pregnancy was complicated by:   Patient Active Problem List   Diagnosis    Hypothyroidism due to Hashimoto's thyroiditis    Raynaud's disease without gangrene    Iron deficiency anemia    Elevated antinuclear antibody (MARLEY) level    Placenta previa in second trimester    Advanced maternal age in multigravida    GBS bacteriuria    40 weeks gestation of pregnancy     25 F Apg 8/9, Wt 8#4       Today she is doing well without any chief complaint. Her lochia is light. She denies chest pain, shortness of breath, and headache. She is  breast feeding and she denies any breast tenderness. She is ambulating well. Her voiding pattern is normal. I reviewed signs and symptoms of post partum depression with the patient, she currently denies any of these symptoms. She is tolerating solids.     Vital Signs:  Vitals:    25 0135 25 0245 25 0556 25 0830   BP: 112/67 (!) 113/56 110/64 102/61   Pulse: 65 69 70 79   Resp:  16  16   Temp:  97.7 °F (36.5 °C) 98.1 °F (36.7 °C) 98.1 °F (36.7 °C)   TempSrc:  Oral  Oral   SpO2:    98%   Height:             Physical Exam:  General:  no apparent distress, alert, and cooperative  Neurologic:  alert, oriented, normal speech, no focal findings or movement disorder noted    Abdomen: abdomen soft, non-distended, non-tender  Fundus: non-tender, normal size, firm, below umbilicus  Extremities:  no calf tenderness, non edematous      Assessment/Plan:  Cuca Sanon is a  PPD # 1 s/p    - Doing well, VSS   - female infant in General Care Nursery   - Encourage ambulation   - Postpartum Hgb/Hct if symptomatic  Rh positive/Rubella immune  Breast feeding   Continue post partum care    Counseling Completed:  Secondary Smoke risks and Sudden Infant Death Syndrome were reviewed with recommendations. Infant sleeping, \"back to sleep\" and avoidance of co-sleeping

## 2025-01-09 NOTE — PROGRESS NOTES
OB Attending    Asked to be rechecked. C/6-7/0, floor bag. Will give Nubain for pain control. Coping very well with labor.

## 2025-01-09 NOTE — L&D DELIVERY SUMMARY NOTE
Vaginal Delivery Note  Department of Obstetrics and Gynecology  [facility]       Patient: Cuca Sanon   : 1990  MRN: [id number]   Date of delivery: 25     Pre-operative Diagnosis: Cuca Sanon  at 40w2d  Spontaneous Labor     Post-operative Diagnosis:  same as above and  living infant female    Delivering Obstetrician & Assistant(s): VERONICA CARL DO    Infant Information:     Apgar scores: 8 at 1 minute and 9 at 5 minutes.     Anesthesia:  none    Application and Delivery:    She was known to be GBS positive and received antibiotic prophylaxis.     The patient progressed well, became complete and felt the urge to push. After pushing with contractions the fetal head delivered Cephalic, left occiput anterior over an intact perineum, nuchal cord was not present.  The anterior, then posterior shoulder delivered easily and atraumatically followed by the rest of the infant. Nose and mouth suctioned with bulb suction, infant was stimulated and dried. Cord was clamped and cut after three minutes delayed cord clamping and infant was placed on maternal abdomen, and attended by RN for evaluation. The delivery of the placenta was spontaneous and appeared intact. Pitocin was started. The vagina was swept of all clots and debris. The perineum and vagina were evaluated and a midline periurethral laceration was found and repaired in standard fashion with 3-0 vicryl. Mother and baby tolerated procedure well.       Delivery Summary:           Specimen: cord blood and cord gases  Estimated blood loss:  500ml immediately following delivery- quantitative not able to be calculated secondary to precipitous nature of delivery  Condition:  infant stable to general nursery and mother stable  Counts: instrument and sponge counts correct  Blood Type and Rh: A POSITIVE      VERONICA CARL DO  Ob/Gyn   2025, 11:55 PM

## 2025-01-09 NOTE — CARE COORDINATION
Social Work     Sw reviewed medical record (current active problem list) and tox screens and found no current concerns.     Sw spoke with mom briefly to explain Sw role, inquire if any needs or concerns, and provide safe sleep education and discuss.  Mom denied any needs or questions and informs baby has a safe sleep environment (bass and crib).     Mom denied any current s/s of anxiety or depression and is aware to reach out to OB if any s/s occur after dc.     Mom reports a really good support system and denied any current questions or needs.      Mom reports this is her 2nd child (2 year old son).       Mom states ped will be [facility].      Sw encouraged mom to reach out if any issues or concerns arise.

## 2025-01-10 VITALS
BODY MASS INDEX: 25.11 KG/M2 | OXYGEN SATURATION: 99 % | DIASTOLIC BLOOD PRESSURE: 68 MMHG | SYSTOLIC BLOOD PRESSURE: 109 MMHG | TEMPERATURE: 97.7 F | HEIGHT: 66 IN | HEART RATE: 64 BPM | RESPIRATION RATE: 16 BRPM

## 2025-01-10 PROCEDURE — 6370000000 HC RX 637 (ALT 250 FOR IP)

## 2025-01-10 RX ADMIN — LEVOTHYROXINE SODIUM 37.5 MCG: 75 TABLET ORAL at 07:53

## 2025-01-10 NOTE — PLAN OF CARE
Problem: Pain  Goal: Verbalizes/displays adequate comfort level or baseline comfort level  Outcome: Adequate for Discharge     Problem: Postpartum  Goal: Experiences normal postpartum course  Description:  Postpartum OB-Pregnancy care plan goal which identifies if the mother is experiencing a normal postpartum course  Outcome: Adequate for Discharge  Goal: Appropriate maternal -  bonding  Description:  Postpartum OB-Pregnancy care plan goal which identifies if the mother and  are bonding appropriately  Outcome: Adequate for Discharge  Goal: Establishment of infant feeding pattern  Description:  Postpartum OB-Pregnancy care plan goal which identifies if the mother is establishing a feeding pattern with their   Outcome: Adequate for Discharge  Goal: Incisions, wounds, or drain sites healing without S/S of infection  Outcome: Adequate for Discharge     Problem: Infection - Adult  Goal: Absence of infection at discharge  Outcome: Adequate for Discharge  Goal: Absence of infection during hospitalization  Outcome: Adequate for Discharge  Goal: Absence of fever/infection during anticipated neutropenic period  Outcome: Adequate for Discharge     Problem: Safety - Adult  Goal: Free from fall injury  Outcome: Adequate for Discharge     Problem: Discharge Planning  Goal: Discharge to home or other facility with appropriate resources  Outcome: Adequate for Discharge     Problem: Chronic Conditions and Co-morbidities  Goal: Patient's chronic conditions and co-morbidity symptoms are monitored and maintained or improved  Outcome: Adequate for Discharge

## 2025-01-10 NOTE — PROGRESS NOTES
Per RN pt did not want meds to beds.    Acetaminophen 500mg  Ibuprofen 600mg  Senokot 8.6-50mg    Meds on file patient can fill or have transferred if wanted.

## 2025-01-10 NOTE — PROGRESS NOTES
POST PARTUM DAY # 2    Cuca Sanon is a 34 y.o. female  This patient was seen & examined today.     Her pregnancy was complicated by:   Patient Active Problem List   Diagnosis    Hypothyroidism due to Hashimoto's thyroiditis    Raynaud's disease without gangrene    Iron deficiency anemia    Elevated antinuclear antibody (MARLEY) level    Placenta previa in second trimester    Advanced maternal age in multigravida    GBS bacteriuria    40 weeks gestation of pregnancy     24 F Apg 8/9, Wt 8#4       Today she is doing well without any chief complaint. Her lochia is light. She denies chest pain, shortness of breath, and headache. She is  breast feeding and she denies any breast tenderness. She is ambulating well. Her voiding pattern is normal. I reviewed signs and symptoms of post partum depression with the patient, she currently denies any of these symptoms. She is tolerating solids.     Vital Signs:  Vitals:    25 1600 25 2100 01/10/25 0500 01/10/25 0800   BP: 107/62 107/74 101/67 109/68   Pulse: 77 72 70 64   Resp: 18 16 16 16   Temp: 97.9 °F (36.6 °C) 98.4 °F (36.9 °C) 98.2 °F (36.8 °C) 97.7 °F (36.5 °C)   TempSrc: Oral Oral Oral    SpO2:  98%  99%   Height:             Physical Exam:  General:  no apparent distress, alert, and cooperative  Neurologic:  alert, oriented, normal speech, no focal findings or movement disorder noted    Abdomen: abdomen soft, non-distended, non-tender  Fundus: non-tender, normal size, firm, below umbilicus  Extremities:  no calf tenderness, non edematous      Assessment/Plan:  Cuca Sanon is a  PPD # 2 s/p    - Doing well, VSS   - female infant in General Care Nursery   - Encourage ambulation   - Postpartum Hgb/Hct if symptomatic    -Plan for discharge today once baby cleared.   Rh positive/Rubella immune  Breast feeding   Continue post partum care    Counseling Completed:  Secondary Smoke risks and Sudden Infant Death Syndrome were reviewed with  recommendations. Infant sleeping, \"back to sleep\" and avoidance of co-sleeping recommendations were reviewed.  Signs and Symptoms of Post Partum Depression were reviewed. The patient is to call if any occur.  Signs and symptoms of Mastitis were reviewed. The patient is to call if any occur for follow up.  Discharge instructions including pelvic rest, no driving with pain medicine and office follow-up were reviewed with patient       Jovana Carbajal MD  Ob/Gyn   1/10/2025, 10:54 AM

## 2025-02-27 ENCOUNTER — POSTPARTUM VISIT (OUTPATIENT)
Dept: OBGYN | Age: 35
End: 2025-02-27

## 2025-02-27 VITALS
HEART RATE: 71 BPM | HEIGHT: 64 IN | DIASTOLIC BLOOD PRESSURE: 66 MMHG | BODY MASS INDEX: 23.9 KG/M2 | SYSTOLIC BLOOD PRESSURE: 112 MMHG | WEIGHT: 140 LBS

## 2025-02-27 DIAGNOSIS — I86.3 LABIAL VARICOSITIES: ICD-10-CM

## 2025-02-27 DIAGNOSIS — O34.80 CYSTOCELE AFFECTING POSTPARTUM PERIOD: Primary | ICD-10-CM

## 2025-02-27 PROCEDURE — 0503F POSTPARTUM CARE VISIT: CPT | Performed by: OBSTETRICS & GYNECOLOGY

## 2025-02-27 ASSESSMENT — PATIENT HEALTH QUESTIONNAIRE - PHQ9
1. LITTLE INTEREST OR PLEASURE IN DOING THINGS: NOT AT ALL
SUM OF ALL RESPONSES TO PHQ QUESTIONS 1-9: 0
2. FEELING DOWN, DEPRESSED OR HOPELESS: NOT AT ALL
SUM OF ALL RESPONSES TO PHQ QUESTIONS 1-9: 0
SUM OF ALL RESPONSES TO PHQ9 QUESTIONS 1 & 2: 0

## 2025-02-27 NOTE — PROGRESS NOTES
Postpartum Visit    Cuca Sanon is a 34 y.o. female , 7 weeks Post Partum s/p  spontaneous vaginal delivery on 25    The patient was seen. She has no chief complaint today.    She is  breast feeding and denies signs or symptoms of mastitis.  The patient completed the E.P.D.S. Post Partum Depression Evaluation form and EPDS Score of 0. She does not have signs or symptoms of post partum depression. She denies any suicidal thoughts with a plan, intent to harm others, and delusional ideas. She does admit to having good home support. Natural family planing for birth control.  Patient states that her varicose veins has resolved since delivery however her right labial varicosity has persisted.  She also is concerned about a possible cystocele.  Does have family history of pelvic floor reconstruction.    Her pregnancy was complicated by:  Patient Active Problem List    Diagnosis Date Noted    40 weeks gestation of pregnancy 2025     24 F Apg 8/9, Wt 8#4 2025    GBS bacteriuria 2024    Advanced maternal age in multigravida 10/08/2024    Placenta previa in second trimester 2024    Elevated antinuclear antibody (MARLEY) level 10/30/2018    Hypothyroidism due to Hashimoto's thyroiditis 2018    Raynaud's disease without gangrene 2018    Iron deficiency anemia 2018       Vitals:   Blood pressure 112/66, pulse 71, height 1.626 m (5' 4\"), weight 63.5 kg (140 lb), last menstrual period 2024, currently breastfeeding.    Physical Exam:  General:  no apparent distress, alert, and cooperative  Abdomen: Abdomen soft, non-tender, no rebound, guarding, rigidity, BS normal. no masses  Fundus: non-tender, normal size, firm, below umbilicus  SSE: Normal-appearing external and internal genitalia, very small cystocele appreciated with bearing down.  Not even a grade 1 cystocele.  Right labial varicosity is still noted however much improved from delivery.  Extremities:  no calf

## 2025-03-24 ENCOUNTER — HOSPITAL ENCOUNTER (OUTPATIENT)
Dept: PHYSICAL THERAPY | Facility: CLINIC | Age: 35
Setting detail: THERAPIES SERIES
Discharge: HOME | End: 2025-03-24
Attending: OBSTETRICS & GYNECOLOGY
Payer: COMMERCIAL

## 2025-03-24 PROCEDURE — 97161 PT EVAL LOW COMPLEX 20 MIN: CPT

## 2025-03-24 PROCEDURE — 97530 THERAPEUTIC ACTIVITIES: CPT

## 2025-03-24 PROCEDURE — 97140 MANUAL THERAPY 1/> REGIONS: CPT

## 2025-03-24 NOTE — CONSULTS
pregnancy. She now has continued ain postpartum which is slowly improving overall, but still present.      Pt is nursing & pillow propping for feeds. She denies pillow propping for pelvic unloading during sleep. .     Pt reports NGUYEN with forceful movements, such as laughing/coughing/sneezing & occasionally with heavy physical activity. She does not wear pads. Pt reports nocturia x1.      Pt denies chronic h/o UTIs/BV/yeast. Hemorrhoids & vulvar varicosities during pregnancy.She has a consult with vascular for the latter.    Pt reports BMs are every day. Pt endorses normal consistency. She denies straining & denies wiping from hemorrhoids. Pt reports water intake throughout the day \"a lot\" per pt report.   Bladder irritants: 1 black tea daily.      Pt reports increased water intake throughout the day ~100+oz.      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 She RTW: 4/7/25.   Pt enjoys running, walking, spending time with family, exercising at home gym.    Pt reports moderate levels of stress due to general life stressors.    Pt denies h/o PPD/PPA.     PMHx: [] Unremarkable [] Diabetes [] HTN  [] Pacemaker  [] MI/Heart Problems [] Cancer [] Arthritis [] Asthma   [x] refer to full medical chart In EPIC  [x] Other: thyroid disease; raynauds     Date of last pap smear and/or vaginal exam (if female): at 7 wks pp - normal      Tests: [x] none recent      Medications: [x] Refer to full medical record [] None [] Other:     Allergies: [x] Refer to full medical record [] None [] Other:      Function: Hand Dominance [x] Right [] Left     Job/ADL Description: see subjective      Pain: [x] Yes [] No Location: pelvic pain/SPD Pain Rating: (0-10 scale) 7/10  Pain altered Tx: [] Yes [x] No Action:     Surgical               History of abdominal surgeries? [] Yes  [x] No [] Other                          If yes, please list:               History of orthopedic

## 2025-04-04 ENCOUNTER — INITIAL CONSULT (OUTPATIENT)
Dept: VASCULAR SURGERY | Age: 35
End: 2025-04-04
Payer: COMMERCIAL

## 2025-04-04 VITALS
WEIGHT: 132 LBS | DIASTOLIC BLOOD PRESSURE: 69 MMHG | HEIGHT: 64 IN | RESPIRATION RATE: 16 BRPM | OXYGEN SATURATION: 98 % | HEART RATE: 105 BPM | BODY MASS INDEX: 22.53 KG/M2 | SYSTOLIC BLOOD PRESSURE: 107 MMHG

## 2025-04-04 DIAGNOSIS — I87.393 VENOUS HYPERTENSION, CHRONIC, WITH COMPLICATION, BILATERAL: Primary | ICD-10-CM

## 2025-04-04 PROCEDURE — 99203 OFFICE O/P NEW LOW 30 MIN: CPT | Performed by: SURGERY

## 2025-04-04 NOTE — PROGRESS NOTES
[facility]  [address] [address] [address]  [place] [address]  Dept: [phone]     Patient: Cuca Sanon  : 1990  MRN: [id number]  DOS: 2025    Referring provider:  Lucrecia Camacho DO         HPI:  Cuca Sanon is a 34 y.o. female who comes to the office for the first time regarding varicosities mostly in the left lower extremity but also on the right labia.  She has questions regarding these varicosities.  The varicosities flareup during pregnancy and then seem to resolve and become asymptomatic after her pregnancy is to term.  She is currently asymptomatic now and has a 4-month-old.  She wanted some advice regarding treatment or management of these varicosities.  Past Medical History:   Diagnosis Date    Hypothyroidism 12 years of age?    Iron deficiency anemia     Raynaud disease     Thyroid disease      Family History   Problem Relation Age of Onset    High Cholesterol Mother     Arthritis Mother     Osteoporosis Mother     Heart Disease Father         CABG    High Blood Pressure Father 55    High Cholesterol Father     Atrial Fibrillation Father     Heart Attack Father     Lung Cancer Paternal Uncle     Diabetes Maternal Grandfather     Cancer Paternal Uncle     Miscarriages / Stillbirths Sister       Social History     Socioeconomic History    Marital status:      Spouse name: Not on file    Number of children: Not on file    Years of education: Not on file    Highest education level: Not on file   Occupational History    Occupation: PA-C   Tobacco Use    Smoking status: Never    Smokeless tobacco: Never   Vaping Use    Vaping status: Never Used   Substance and Sexual Activity    Alcohol use: Not Currently     Comment: Rarely 1-2 glasses of wine per month at an event    Drug use: Never    Sexual activity: Yes     Partners: Male   Other Topics Concern    Not on file   Social History Narrative

## 2025-04-08 ENCOUNTER — HOSPITAL ENCOUNTER (OUTPATIENT)
Dept: PHYSICAL THERAPY | Facility: CLINIC | Age: 35
Setting detail: THERAPIES SERIES
Discharge: HOME | End: 2025-04-08
Attending: OBSTETRICS & GYNECOLOGY
Payer: COMMERCIAL

## 2025-04-08 PROCEDURE — 97110 THERAPEUTIC EXERCISES: CPT

## 2025-04-08 PROCEDURE — 97140 MANUAL THERAPY 1/> REGIONS: CPT

## 2025-04-08 NOTE — FLOWSHEET NOTE
[x] [facility]  Outpatient Rehabilitation &  Therapy  [address]  P: [phone]  F: [phone]     Physical Therapy Daily Treatment Note    Date:  2025  Patient Name:  Cuca Sanon    :  1990  MRN: [id number]  Physician: Lucrecia Camacho                  Insurance: BCBS; 60vs BC[place] (no co-pay) 7vs approved 3/24/25-25  th ex, th act, neuro re-ed, man th, attend estim   Medical Diagnosis: O34.80 (ICD-10-CM) - Cystocele affecting postpartum period   Rehab Codes: R27.8, R29.3, N39.3, M62.81, N94.1, R10.2, M99.05  Onset Date: 25       Next 's appt.: 25- vascular   Visit# / total visits: ; 7 visits approved    Cancels/No Shows: 0    Subjective:    Pain:  [] Yes  [x] No Location:  N/A  Pain Rating: (0-10 scale) 0/10  Pain altered Tx:  [x] No  [] Yes  Action:    Comments: Pt without pain at this time, stating her pain is not consistent and not always consistent with activity.     Objective:  Modalities:   Precautions:   Exercises: [employer] Access Code: [email or web address]  KT= Kinesiotape  PB= pelvic brace (DB-exhale+ TA contraction + kegel)  DB= diaphragmatic breathing  Exercise Reps/ Time Weight/ Level Comments   Pelvic model explanation x    PF function - 3 layers  Analogies - IAP (hammock/sling, core cannister)  Diaphragm & pelvic floor relationship (visual aid)     exhale with effort  pelvic brace (exhale, core contraction & pelvic floor muscle lift)                            Supine          Diaphragmatic breathing  HEP       Happy Baby with Pelvic Floor Lengthening  HEP       Pelvic shot gun  10x ea 5\"    Transversus Abdominis Bracing with Pelvic Floor Contraction 10x6\"   4 x daily   Piriformis Stretch-fig4 1' ea       Bridge with Mini Swiss Ball Between Knees 10x       Butterfly stretch 1'  Overpressure applied             Quadruped         Adductor rock backs HEP       Other: Pt's infant Alejandra present for treatment    Manual: checked for somatic  Impression: Macular degeneration, dry OS. Status: Chronic. Plan: The patient notes blurring. Exam and OCT reveal no IRF OS. An IVFA from 03/31/2020 demonstrated no leakage. Fundus Photos from 03/31/2020 demonstrated drusen. Observe. Discussed AREDS / I Vit and Amsler grid.

## 2025-04-15 ENCOUNTER — HOSPITAL ENCOUNTER (OUTPATIENT)
Dept: PHYSICAL THERAPY | Facility: CLINIC | Age: 35
Setting detail: THERAPIES SERIES
Discharge: HOME | End: 2025-04-15
Attending: OBSTETRICS & GYNECOLOGY
Payer: COMMERCIAL

## 2025-04-15 PROCEDURE — 97110 THERAPEUTIC EXERCISES: CPT

## 2025-04-15 PROCEDURE — 97140 MANUAL THERAPY 1/> REGIONS: CPT

## 2025-04-15 NOTE — FLOWSHEET NOTE
[email or web address]  Date: 04/15/2025  Prepared by: Daniele Ferguson  Program Notes  exhale with effortuse pelvic brace (exhale, core contraction & pelvic floor muscle lift) in high pressure moments  Exercises  - Supine Diaphragmatic Breathing  - 1 x daily - 10 reps  - Happy Baby with Pelvic Floor Lengthening  - 3-5 x weekly - 1 reps - 60 hold  - Supine Transversus Abdominis Bracing with Pelvic Floor Contraction  - 4 x daily - 1 sets - 10 reps - 7 hold  - Supine Piriformis Stretch  - 3-5 x weekly - 1 reps - 60 hold  - Hip Flexor Stretch at Edge of Bed  - 1 x daily - 1 reps - 1 minute hold  - Supine Bridge with Mini Swiss Ball Between Knees  - 1 x daily - 3-5 x weekly - 1 sets - 10 reps  - Pelvic Floor Contractions in Hooklying with Resisted Abduction  - 1 x daily - 3-5 x weekly - 10 reps  - Bent Knee Fallouts  - 1 x daily - 3-5 x weekly - 2 sets - 10 reps  - Single Leg Bridge  - 1 x daily - 3-5 x weekly - 10 reps  - Kneeling Adductor Stretch with Hip External Rotation  - 3-5 x weekly - 5-10 reps  - Quadruped hip hike on yoga block  - 1 x daily - 3-5 x weekly - 10 reps  - Single Leg Running Balance  - 1 x daily - 3-5 x weekly - 10 reps  Patient Education  - Urinary Incontinence  - Get To Know Your Pelvic Floor- Female  - Sleep Positions  [x] Verbalizes understanding.  [x] Demonstrates understanding.  [x] Needs review.  [x] Demonstrates/verbalizes HEP/Ed previously given.     Plan: [x] Continue current frequency toward long and short term goals.    [x] Specific Instructions for subsequent treatments: hip/psoas/adductor manual sequence, core/pf strengthening/pressure management, pelvic brace & progression       Time In:9:04am           Time Out: 9:58am    Electronically signed by:  DANIELE FERGUSON PTA

## 2025-04-22 ENCOUNTER — HOSPITAL ENCOUNTER (OUTPATIENT)
Dept: PHYSICAL THERAPY | Facility: CLINIC | Age: 35
Setting detail: THERAPIES SERIES
Discharge: HOME | End: 2025-04-22
Attending: OBSTETRICS & GYNECOLOGY
Payer: COMMERCIAL

## 2025-04-22 PROCEDURE — 97140 MANUAL THERAPY 1/> REGIONS: CPT

## 2025-04-22 PROCEDURE — 97110 THERAPEUTIC EXERCISES: CPT

## 2025-04-22 NOTE — FLOWSHEET NOTE
[x] [facility]  Outpatient Rehabilitation &  Therapy  [address]  P: [phone]  F: [phone]     Physical Therapy Daily Treatment Note    Date:  2025  Patient Name:  Cuca Sanon    :  1990  MRN: [id number]  Physician: Lucrecia Camacoh                  Insurance: BCBS; 60vs [place] (no co-pay) 7vs approved 3/24/25-25  th ex, th act, neuro re-ed, man th, attend estim   Medical Diagnosis: O34.80 (ICD-10-CM) - Cystocele affecting postpartum period   Rehab Codes: R27.8, R29.3, N39.3, M62.81, N94.1, R10.2, M99.05  Onset Date: 25       Next 's appt.: 25- vascular   Visit# / total visits: ; 7 visits approved    Cancels/No Shows: 0    Subjective:    Pain:  [] Yes  [x] No Location:  N/A   Pain Rating: (0-10 scale) 0/10  Pain altered Tx:  [x] No  [] Yes  Action:    Comments: Pt had increased pubic pain and tenderness after last session. Pt reports pain lasted a few days, but has since subsided.     Objective:  Modalities:   Precautions:   Exercises: [employer] Access Code: [email or web address]  KT= Kinesiotape  PB= pelvic brace (DB-exhale+ TA contraction + kegel)  DB= diaphragmatic breathing  Exercise Reps/ Time Weight/ Level Comments   Pelvic model explanation x    PF function - 3 layers  Analogies - IAP (hammock/sling, core cannister)  Diaphragm & pelvic floor relationship (visual aid)     exhale with effort  pelvic brace (exhale, core contraction & pelvic floor muscle lift)                            Supine          Diaphragmatic breathing  HEP       Happy Baby with Pelvic Floor Lengthening  HEP       Pelvic shot gun  10x ea 5\"    Transversus Abdominis Bracing with Pelvic Floor Contraction 10x8\"   Increased hold time 4/15   Piriformis Stretch-fig4 1' ea       Bridge with Mini Swiss Ball Between Knees 10x       Hip abd with PB 10x Plum  Added 4/15   Bent knee fall out with PB 10x ea Plum  Added 4/15   Butterfly stretch 1'  Overpressure applied   Cheng

## 2025-04-28 NOTE — FLOWSHEET NOTE
[] [facility]  Outpatient Rehabilitation &  Therapy  [address]  [phone]  [phone] [x] [facility]  Outpatient Rehabilitation &  Therapy  [address]  P: [phone]  F: [phone] [] [facility]  Outpatient Rehabilitation &  Therapy  [address]  P: [phone]  F: [phone] [] [facility]  Outpatient Rehabilitation &  Therapy  [address]  [phone]  [phone] [] [facility]  Outpatient Rehabilitation &  Therapy  [address]   P: [phone]  F: [phone]  [] [facility]  Outpatient Rehabilitation &  Therapy  [address]  P: [phone]  F: [phone] [] [facility]  Outpatient Rehabilitation &  Therapy  [address]  P: [phone]  F: [phone] [] [facility]  Outpatient Rehabilitation &  Therapy  [address]  P: [phone]  F: [phone] [] [facility]  Outpatient Rehabilitation &  Therapy  [address]  P: [phone]  F: [phone]  [] [facility] Outpatient Rehabilitation &  Therapy  [address]  P: [phone]  F: [phone]     Therapy Cancel/No Show note    Date: 2025  Patient: Cuca E Fab  : 1990  MRN: [id number]    Cancels/No Shows to date:     For today's appointment patient:    [x]  Cancelled appt for     [] Rescheduled appointment    [] No-show     Reason given by patient:    [x]  Patient ill    []  Conflicting appointment    [] No transportation      [] Conflict with work    [] No reason given    [] Weather related    [] COVID-19    [] Other:      Comments:        [x] Next appointment was confirmed    Electronically signed by: DANIELE FERGUSON PTA

## 2025-04-29 ENCOUNTER — HOSPITAL ENCOUNTER (OUTPATIENT)
Dept: PHYSICAL THERAPY | Facility: CLINIC | Age: 35
Setting detail: THERAPIES SERIES
Discharge: HOME | End: 2025-04-29
Attending: OBSTETRICS & GYNECOLOGY
Payer: COMMERCIAL

## 2025-05-09 ENCOUNTER — HOSPITAL ENCOUNTER (OUTPATIENT)
Dept: PHYSICAL THERAPY | Facility: CLINIC | Age: 35
Setting detail: THERAPIES SERIES
Discharge: HOME | End: 2025-05-09
Attending: OBSTETRICS & GYNECOLOGY
Payer: COMMERCIAL

## 2025-05-09 PROCEDURE — 97110 THERAPEUTIC EXERCISES: CPT

## 2025-05-09 PROCEDURE — 97140 MANUAL THERAPY 1/> REGIONS: CPT

## 2025-05-09 NOTE — FLOWSHEET NOTE
[x] [facility]  Outpatient Rehabilitation &  Therapy  [address]  P: [phone]  F: [phone]     Physical Therapy Daily Treatment Note    Date:  2025  Patient Name:  Cuca Sanon    :  1990  MRN: [id number]  Physician: Lucrecia Camacho                  Insurance: BCBS; 60vs [place] (no co-pay) 7vs approved 3/24/25-25  th ex, th act, neuro re-ed, man th, attend estim   Medical Diagnosis: O34.80 (ICD-10-CM) - Cystocele affecting postpartum period   Rehab Codes: R27.8, R29.3, N39.3, M62.81, N94.1, R10.2, M99.05  Onset Date: 25       Next 's appt.: unknown  Visit# / total visits: ; 7 visits approved until 25   Cancels/No Shows: 1/0    Subjective:    Pain:  [] Yes  [x] No Location:  N/A   Pain Rating: (0-10 scale) 0/10  Pain altered Tx:  [x] No  [] Yes  Action:    Comments: Pt has been ill since last seen. Rested while sick and was with significant pain reduction during this time of rest. Pt states that since she started feeling better she has gradually resumed exercises and pain has been much better controlled. Pt noticed mild pubic pain during running only. Pt was coughing significantly while ill and did experience leakage with forceful bouts of coughing, but has not had any leakage since and not with occasional cough/sneeze/laugh.     Objective:  Modalities:   Precautions:   Exercises: [employer] Access Code: [email or web address]  KT= Kinesiotape  PB= pelvic brace (DB-exhale+ TA contraction + kegel)  DB= diaphragmatic breathing  Exercise Reps/ Time Weight/ Level Comments   Pelvic model explanation x    PF function - 3 layers  Analogies - IAP (hammock/sling, core cannister)  Diaphragm & pelvic floor relationship (visual aid)     exhale with effort  pelvic brace (exhale, core contraction & pelvic floor muscle lift)                            Supine          Diaphragmatic breathing  HEP       Happy Baby with Pelvic Floor Lengthening  HEP       Pelvic

## 2025-05-16 ENCOUNTER — APPOINTMENT (OUTPATIENT)
Dept: PHYSICAL THERAPY | Facility: CLINIC | Age: 35
End: 2025-05-16
Attending: OBSTETRICS & GYNECOLOGY
Payer: COMMERCIAL

## 2025-05-30 ENCOUNTER — HOSPITAL ENCOUNTER (OUTPATIENT)
Dept: PHYSICAL THERAPY | Facility: CLINIC | Age: 35
Setting detail: THERAPIES SERIES
End: 2025-05-30
Attending: OBSTETRICS & GYNECOLOGY
Payer: COMMERCIAL

## 2025-06-09 NOTE — FLOWSHEET NOTE
[] [facility]  Outpatient Rehabilitation &  Therapy  [address]  [phone]  [phone] [x] [facility]  Outpatient Rehabilitation &  Therapy  [address]  P: [phone]  F: [phone] [] [facility]  Outpatient Rehabilitation &  Therapy  [address]  P: [phone]  F: [phone] [] [facility]  Outpatient Rehabilitation &  Therapy  [address]  [phone]  [phone] [] [facility]  Outpatient Rehabilitation &  Therapy  [address]   P: [phone]  F: [phone]  [] [facility]  Outpatient Rehabilitation &  Therapy  [address]  P: [phone]  F: [phone] [] [facility]  Outpatient Rehabilitation &  Therapy  [address]  P: [phone]  F: [phone] [] [facility]  Outpatient Rehabilitation &  Therapy  [address]  P: [phone]  F: [phone] [] [facility]  Outpatient Rehabilitation &  Therapy  [address]  P: [phone]  F: [phone]  [] [facility] Outpatient Rehabilitation &  Therapy  [address]  P: [phone]  F: [phone]     Therapy Cancel/No Show note    Date: 2025  Patient: Cuca E Fab  : 1990  MRN: [id number]    Cancels/No Shows to date: 0    For today's appointment patient:    [x]  Cancelled appt for 6/10    [] Rescheduled appointment    [] No-show     Reason given by patient:    []  Patient ill    [x]  Conflicting appointment    [] No transportation      [] Conflict with work    [] No reason given    [] Weather related    [] COVID-19    [] Other:      Comments:        [x] Next appointment was confirmed    Electronically signed by: DANIELE FERGUSON PTA

## 2025-06-10 ENCOUNTER — HOSPITAL ENCOUNTER (OUTPATIENT)
Dept: PHYSICAL THERAPY | Facility: CLINIC | Age: 35
Setting detail: THERAPIES SERIES
Discharge: HOME | End: 2025-06-10
Attending: OBSTETRICS & GYNECOLOGY